# Patient Record
Sex: MALE | Race: WHITE | Employment: OTHER | URBAN - METROPOLITAN AREA
[De-identification: names, ages, dates, MRNs, and addresses within clinical notes are randomized per-mention and may not be internally consistent; named-entity substitution may affect disease eponyms.]

---

## 2017-03-06 ENCOUNTER — HOSPITAL ENCOUNTER (OUTPATIENT)
Dept: ENDOSCOPY | Age: 62
Discharge: HOME | End: 2017-03-06
Attending: INTERNAL MEDICINE | Admitting: INTERNAL MEDICINE

## 2017-03-06 RX ORDER — SODIUM CHLORIDE 0.9 % (FLUSH) 0.9 %
10 SYRINGE (ML) INJECTION EVERY 12 HOURS SCHEDULED
Status: DISCONTINUED | OUTPATIENT
Start: 2017-03-06 | End: 2017-03-06

## 2017-03-06 RX ORDER — LIDOCAINE HYDROCHLORIDE 10 MG/ML
1 INJECTION, SOLUTION EPIDURAL; INFILTRATION; INTRACAUDAL; PERINEURAL
Status: ACTIVE | OUTPATIENT
Start: 2017-03-06 | End: 2017-03-06

## 2017-03-06 RX ORDER — SODIUM CHLORIDE 0.9 % (FLUSH) 0.9 %
10 SYRINGE (ML) INJECTION PRN
Status: DISCONTINUED | OUTPATIENT
Start: 2017-03-06 | End: 2017-03-06

## 2017-03-06 RX ORDER — SODIUM CHLORIDE 9 MG/ML
INJECTION, SOLUTION INTRAVENOUS CONTINUOUS
Status: DISCONTINUED | OUTPATIENT
Start: 2017-03-06 | End: 2017-03-06

## 2017-03-06 RX ORDER — SODIUM CHLORIDE 0.9 % (FLUSH) 0.9 %
10 SYRINGE (ML) INJECTION EVERY 12 HOURS SCHEDULED
Status: DISCONTINUED | OUTPATIENT
Start: 2017-03-06 | End: 2017-03-07 | Stop reason: HOSPADM

## 2017-03-06 RX ORDER — SODIUM CHLORIDE 0.9 % (FLUSH) 0.9 %
10 SYRINGE (ML) INJECTION PRN
Status: DISCONTINUED | OUTPATIENT
Start: 2017-03-06 | End: 2017-03-07 | Stop reason: HOSPADM

## 2017-03-06 RX ORDER — SODIUM CHLORIDE 9 MG/ML
INJECTION, SOLUTION INTRAVENOUS CONTINUOUS
Status: DISCONTINUED | OUTPATIENT
Start: 2017-03-06 | End: 2017-03-07 | Stop reason: HOSPADM

## 2017-03-06 ASSESSMENT — ENCOUNTER SYMPTOMS: SHORTNESS OF BREATH: 0

## 2021-12-02 ENCOUNTER — HOSPITAL ENCOUNTER (OUTPATIENT)
Dept: MRI IMAGING | Age: 66
Discharge: HOME | End: 2021-12-02
Payer: MEDICARE

## 2021-12-02 DIAGNOSIS — M75.122 COMPLETE TEAR OF LEFT ROTATOR CUFF, UNSPECIFIED WHETHER TRAUMATIC: ICD-10-CM

## 2021-12-02 PROCEDURE — 73221 MRI JOINT UPR EXTREM W/O DYE: CPT

## 2021-12-21 ENCOUNTER — HOSPITAL ENCOUNTER (OUTPATIENT)
Dept: PHYSICAL THERAPY | Age: 66
Setting detail: THERAPIES SERIES
Discharge: HOME | End: 2021-12-21
Payer: MEDICARE

## 2021-12-21 PROCEDURE — 97161 PT EVAL LOW COMPLEX 20 MIN: CPT

## 2021-12-21 PROCEDURE — 97016 VASOPNEUMATIC DEVICE THERAPY: CPT

## 2021-12-21 PROCEDURE — 97110 THERAPEUTIC EXERCISES: CPT

## 2021-12-21 NOTE — FLOWSHEET NOTE
[facility]    Physical Therapy Treatment Note/ Progress Report:     Date:  2021    Patient Name:  Garth Finley    :  1955  MRN: [id number]  Medical/Treatment Diagnosis Information:  · Diagnosis: s/p L shoulder rotator cuff repair 21  · Treatment Diagnosis: L shoulder pain Y57.772  Insurance/Certification information:  PT Insurance Information: Medicare  Physician Information:  Referring Practitioner: Lyudmila Sorenson DO  Plan of care signed (Y/N):     Date of Patient follow up with Physician:      Progress Report: []  Yes  [x]  No     Functional Scale:   21 SPADI = 86% disability    Date Range for reporting period:  Beginnin21  Ending:      Progress report due (10 Rx/or 30 days whichever is less):      Recertification due (POC duration/ or 90 days whichever is less): 3/21/22     Visit # Insurance Allowable Auth Needed   1 medicare []Yes    []No     Pain level:  0-5/10     SUBJECTIVE:  See eval    OBJECTIVE: See eval   Observation:    Test measurements:      RESTRICTIONS/PRECAUTIONS: large RCR, sAD, DCE -  DOS 21    Exercises/Interventions:   Therapeutic Ex Wt / Resistance Sets/sec Reps Notes   scap retraction  2 10    Shoulder rolls  2 10    Wand ER  1 15    Table slides  1 15                                                                             Therapeutic Activities                                                                      Manual Intervention       Shoulder PROM  5 min  V. guarded   Post Cap mobs       Thoracic/Rib manipulation       CT MT/Mobs       PROM MT                     NMR re-education                                                                   Therapeutic Exercise and NMR EXR  [x] (76070) Provided verbal/tactile cueing for activities related to strengthening, flexibility, endurance, ROM  for improvements in scapular, scapulothoracic and UE control with self care, reaching, carrying, lifting, house/yardwork, driving/computer work.    [] (55357) Provided verbal/tactile cueing for activities related to improving balance, coordination, kinesthetic sense, posture, motor skill, proprioception  to assist with  scapular, scapulothoracic and UE control with self care, reaching, carrying, lifting, house/yardwork, driving/computer work. Therapeutic Activities:    [x] (81451 or 54030) Provided verbal/tactile cueing for activities related to improving balance, coordination, kinesthetic sense, posture, motor skill, proprioception and motor activation to allow for proper function of scapular, scapulothoracic and UE control with self care, carrying, lifting, driving/computer work.      Home Exercise Program:    [x] (48979) Reviewed/Progressed HEP activities related to strengthening, flexibility, endurance, ROM of scapular, scapulothoracic and UE control with self care, reaching, carrying, lifting, house/yardwork, driving/computer work  [] (99339) Reviewed/Progressed HEP activities related to improving balance, coordination, kinesthetic sense, posture, motor skill, proprioception of scapular, scapulothoracic and UE control with self care, reaching, carrying, lifting, house/yardwork, driving/computer work      Manual Treatments:  PROM / STM / Oscillations-Mobs:  G-I, II, III, IV (PA's, Inf., Post.)  [x] (32176) Provided manual therapy to mobilize soft tissue/joints of cervical/CT, scapular GHJ and UE for the purpose of modulating pain, promoting relaxation,  increasing ROM, reducing/eliminating soft tissue swelling/inflammation/restriction, improving soft tissue extensibility and allowing for proper ROM for normal function with self care, reaching, carrying, lifting, house/yardwork, driving/computer work    Modalities:  Game ready for post op swelling x 15 min    Charges:  Timed Code Treatment Minutes: eval +15   Total Treatment Minutes: 60       [x] EVAL (LOW) 57050 (typically 20 minutes face-to-face)  [] EVAL (MOD) 93844 (typically 30 minutes face-to-face)  [] EVAL (HIGH) 56550 (typically 45 minutes face-to-face)  [] RE-EVAL     [x] ES(65224) x     [] IONTO (04522)  [] NMR (70316) x     [x] VASO (44404)  [] Manual (28213) x     [] Other:  [] TA (71792)x     [] Mech Traction (38095)  [] ES(attended) (36089)     [] ES (un) (97103):       GOALS:  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 Progressing: []? Met: []? Not Met: []? Adjusted     Therapist goals for Patient:   Short Term Goals: To be achieved in: 2 weeks  1. Independent in HEP and progression per patient tolerance, in order to prevent re-injury. []? Progressing: []? Met: []? Not Met: []? Adjusted  2. Patient will have a decrease in pain to facilitate improvement in movement, function, and ADLs as indicated by Functional Deficits. []? Progressing: []? Met: []? Not Met: []? Adjusted     Long Term Goals: To be achieved in: 8-12 weeks  1. Disability index score of 20% or less for the DASH to assist with reaching prior level of function. []? Progressing: []? Met: []? Not Met: []? Adjusted  2. Patient will demonstrate increased AROM to [facility] to allow for proper joint functioning as indicated by patients Functional Deficits. []? Progressing: []? Met: []? Not Met: []? Adjusted  3. Patient will demonstrate an increase in Strength to within 5lbs of contralateral shoulder to allow for proper functional mobility as indicated by patients Functional Deficits. []? Progressing: []? Met: []? Not Met: []? Adjusted  4. Patient will return to bathing/dressing/ADLs without increased symptoms or restriction. []? Progressing: []? Met: []? Not Met: []? Adjusted  5. Pt will tolerate reaching OH without increased symptoms    []? Progressing: []? Met: []? Not Met: []? Adjusted    Progression Towards Functional goals:  [] Patient is progressing as expected towards functional goals listed. [] Progression is slowed due to complexities listed.   [] Progression has been slowed due to co-morbidities. [x] Plan just implemented, too soon to assess goals progression  [] Other:     ASSESSMENT:  See eval      Treatment/Activity Tolerance:  [x] Patient tolerated treatment well [] Patient limited by fatique  [] Patient limited by pain  [] Patient limited by other medical complications  [] Other:     Overall Progression Towards Functional goals/ Treatment Progress Update:  [] Patient is progressing as expected towards functional goals listed. [] Progression is slowed due to complexities/Impairments listed. [] Progression has been slowed due to co-morbidities. [x] Plan just implemented, too soon to assess goals progression <30days   [] Goals require adjustment due to lack of progress  [] Patient is not progressing as expected and requires additional follow up with physician  [] Other    Prognosis for POC: [x] Good [] Fair  [] Poor    Patient requires continued skilled intervention: [x] Yes  [] No      PLAN: See eval  [] Continue per plan of care [] Alter current plan (see comments)  [x] Plan of care initiated [] Hold pending MD visit [] Discharge    Electronically signed by: Samara Laguerre PT     Note: If patient does not return for scheduled/recommended follow up visits, this note will serve as a discharge from care along with the most recent update on progress.

## 2021-12-21 NOTE — PLAN OF CARE
[facility]  [address] [place] [address]  Phone [phone]   Fax [phone]                                                       Physical Therapy Certification    Dear Referring Practitioner: Randa Fleischer, DO,    We had the pleasure of evaluating the following patient for physical therapy services at [address] [place] [address]. A summary of our findings can be found in the initial assessment below. This includes our plan of care. If you have any questions or concerns regarding these findings, please do not hesitate to contact me at the office phone number checked above. Thank you for the referral.       Physician Signature:_______________________________Date:__________________  By signing above (or electronic signature), therapists plan is approved by physician      Patient: Rony Flanagan   : 1955   MRN: [id number]  Referring Physician: Referring Practitioner: Randa Fleischer, DO      Evaluation Date: 2021      Medical Diagnosis Information:  Diagnosis: s/p L shoulder rotator cuff repair 21   Treatment Diagnosis: L shoulder pain M25.512                                         Insurance information: PT Insurance Information: Medicare    Precautions/ Contra-indications: large repair    C-SSRS Triggered by Intake questionnaire (Past 2 wk assessment):   [x] No, Questionnaire did not trigger screening.   [] Yes, Patient intake triggered further evaluation      [] C-SSRS Screening completed  [] PCP notified via Plan of Care  [] Emergency services notified     Latex Allergy:  [x]NO      []YES  Preferred Language for Healthcare:   [x]English       []Other:      SUBJECTIVE: Patient stated complaint: Pt is s/p large left shoulder rotator cuff repair performed on 21. Pt states that he is feeling better today than last week, pain is starting to decrease. Minimal pain when in the sling.   Can get sharp physician regarding ROS issues if not already being addressed at this time. Co-morbidities/Complexities (which will affect course of rehabilitation):   []None           Arthritic conditions   []Rheumatoid arthritis (M05.9)  []Osteoarthritis (M19.91)   Cardiovascular conditions   []Hypertension (I10)  []Hyperlipidemia (E78.5)  []Angina pectoris (I20)  []Atherosclerosis (I70)   Musculoskeletal conditions   []Disc pathology   []Congenital spine pathologies   []Prior surgical intervention  []Osteoporosis (M81.8)  []Osteopenia (M85.8)   Endocrine conditions   []Hypothyroid (E03.9)  []Hyperthyroid Gastrointestinal conditions   []Constipation (Z73.58)   Metabolic conditions   []Morbid obesity (E66.01)  []Diabetes type 1(E10.65) or 2 (E11.65)   []Neuropathy (G60.9)     Pulmonary conditions   []Asthma (J45)  []Coughing   []COPD (J44.9)   Psychological Disorders  []Anxiety (F41.9)  []Depression (F32.9)   []Other:   []Other:          Barriers to/and or personal factors that will affect rehab potential:              [x]Age  []Sex              []Motivation/Lack of Motivation                        []Co-Morbidities              []Cognitive Function, education/learning barriers              []Environmental, home barriers              []profession/work barriers  []past PT/medical experience  []other:  Justification: impaired healing     Falls Risk Assessment (30 days):   [x] Falls Risk assessed and no intervention required.   [] Falls Risk assessed and Patient requires intervention due to being higher risk   TUG score (>12s at risk):     [] Falls education provided, including        ASSESSMENT: s/p left shoulder large rotator cuff repair with SAD, and DCE on 12/14/21  Functional Impairments   [x]Noted spinal or UE joint hypomobility   []Noted spinal or UE joint hypermobility   [x]Decreased UE functional ROM   [x]Decreased UE functional strength   []Abnormal reflexes/sensation/myotomal/dermatomal deficits   [x]Decreased Therapy Evaluation Complexity Justification  [x] A history of present problem with:  [x] no personal factors and/or comorbidities that impact the plan of care;  []1-2 personal factors and/or comorbidities that impact the plan of care  []3 personal factors and/or comorbidities that impact the plan of care  [x] An examination of body systems using standardized tests and measures addressing any of the following: body structures and functions (impairments), activity limitations, and/or participation restrictions;:  [x] a total of 1-2 or more elements   [] a total of 3 or more elements   [] a total of 4 or more elements   [x] A clinical presentation with:  [x] stable and/or uncomplicated characteristics   [] evolving clinical presentation with changing characteristics  [] unstable and unpredictable characteristics;   [x] Clinical decision making of [x] low, [] moderate, [] high complexity using standardized patient assessment instrument and/or measurable assessment of functional outcome. [x] EVAL (LOW) 92183 (typically 30 minutes face-to-face)  [] EVAL (MOD) 22094 (typically 30 minutes face-to-face)  [] EVAL (HIGH) 75020 (typically 45 minutes face-to-face)  [] RE-EVAL     PLAN:  Frequency/Duration:  1-2 days per week for 8-12 Weeks:  INTERVENTIONS:  [x] Therapeutic exercise including: strength training, ROM, for Upper extremity and core   [x]  NMR activation and proprioception for UE, scap and Core   [x] Manual therapy as indicated for shoulder, scapula and spine to include: Dry Needling/IASTM, STM, PROM, Gr I-IV mobilizations, manipulation. [x] Modalities as needed that may include: thermal agents, E-stim, Biofeedback, US, iontophoresis as indicated  [x] Patient education on joint protection, postural re-education, activity modification, progression of HEP. HEP instruction: Patient instructed in, and demonstrated proper form of, exercises.  Copy of exercises scanned into media file  (see scanned

## 2021-12-29 ENCOUNTER — HOSPITAL ENCOUNTER (OUTPATIENT)
Dept: PHYSICAL THERAPY | Age: 66
Setting detail: THERAPIES SERIES
End: 2021-12-29
Payer: MEDICARE

## 2021-12-30 ENCOUNTER — HOSPITAL ENCOUNTER (OUTPATIENT)
Dept: PHYSICAL THERAPY | Age: 66
Setting detail: THERAPIES SERIES
Discharge: HOME | End: 2021-12-30
Payer: MEDICARE

## 2021-12-30 PROCEDURE — 97110 THERAPEUTIC EXERCISES: CPT

## 2021-12-30 PROCEDURE — 97016 VASOPNEUMATIC DEVICE THERAPY: CPT

## 2021-12-30 PROCEDURE — 97140 MANUAL THERAPY 1/> REGIONS: CPT

## 2021-12-30 NOTE — FLOWSHEET NOTE
[facility] [employer] [facility]    Physical Therapy Treatment Note/ Progress Report:     Date:  2021    Patient Name:  Petr Goldman    :  1955  MRN: [id number]  Medical/Treatment Diagnosis Information:  · Diagnosis: s/p L shoulder rotator cuff repair 21  · Treatment Diagnosis: L shoulder pain V25.292  Insurance/Certification information:  PT Insurance Information: Medicare  Physician Information:  Referring Practitioner: Kathe Appiah DO  Plan of care signed (Y/N): Pending    Date of Patient follow up with Physician:      Progress Report: []  Yes  [x]  No     Functional Scale:   21 SPADI = 86% disability    Date Range for reporting period:  Beginnin21  Ending:      Progress report due (10 Rx/or 30 days whichever is less):      Recertification due (POC duration/ or 90 days whichever is less): 3/21/22     Visit # Insurance Allowable Auth Needed   2 medicare []Yes    []No     Pain level:  0-5/10     SUBJECTIVE:  Pt notes he experiences pain at night when trying to sleep. He is current spending time between a sofa and recliner. Pt states he has been compliant with his HEP. OBJECTIVE:     Observation:    Test measurements:      PROM (L) Shoulder:   75 deg with table slide    RESTRICTIONS/PRECAUTIONS: large RCR, sAD, DCE -  DOS 21    Exercises/Interventions:   Therapeutic Ex x 15 Wt / Resistance Sets/sec Reps Notes   scap retraction  2 10    Shoulder rolls  2 10    Wand ER  1 15    Table slides  1 15    Pendulums Fwd back, CW, CCW  x20 a Reviewed    scap clocks npv                                                                 Therapeutic Activities                                                                      Manual Intervention x 15       Shoulder PROM Gentle oscillations, flex, ER, abd within appropriate post op restrictions 15 min  Significant guarding noted initially.   Improved with oscillations and cueing   Post Cap mobs       Thoracic/Rib manipulation       CT MT/Mobs       PROM MT                     NMR re-education                                                                   Therapeutic Exercise and NMR EXR  [x] (52627) Provided verbal/tactile cueing for activities related to strengthening, flexibility, endurance, ROM  for improvements in scapular, scapulothoracic and UE control with self care, reaching, carrying, lifting, house/yardwork, driving/computer work.    [] (27112) Provided verbal/tactile cueing for activities related to improving balance, coordination, kinesthetic sense, posture, motor skill, proprioception  to assist with  scapular, scapulothoracic and UE control with self care, reaching, carrying, lifting, house/yardwork, driving/computer work. Therapeutic Activities:    [x] (12091 or 48553) Provided verbal/tactile cueing for activities related to improving balance, coordination, kinesthetic sense, posture, motor skill, proprioception and motor activation to allow for proper function of scapular, scapulothoracic and UE control with self care, carrying, lifting, driving/computer work.      Home Exercise Program:    [x] (55592) Reviewed/Progressed HEP activities related to strengthening, flexibility, endurance, ROM of scapular, scapulothoracic and UE control with self care, reaching, carrying, lifting, house/yardwork, driving/computer work  [] (97001) Reviewed/Progressed HEP activities related to improving balance, coordination, kinesthetic sense, posture, motor skill, proprioception of scapular, scapulothoracic and UE control with self care, reaching, carrying, lifting, house/yardwork, driving/computer work      Manual Treatments:  PROM / STM / Oscillations-Mobs:  G-I, II, III, IV (PA's, Inf., Post.)  [x] (87218) Provided manual therapy to mobilize soft tissue/joints of cervical/CT, scapular GHJ and UE for the purpose of modulating pain, promoting relaxation,  increasing ROM, reducing/eliminating soft tissue swelling/inflammation/restriction, improving soft tissue extensibility and allowing for proper ROM for normal function with self care, reaching, carrying, lifting, house/yardwork, driving/computer work    Modalities:  Game ready for post op swelling x 10 min    Charges:  Timed Code Treatment Minutes: 30'   Total Treatment Minutes: 40'       [] EVAL (LOW) 455 1011 (typically 20 minutes face-to-face)  [] EVAL (MOD) 66414 (typically 30 minutes face-to-face)  [] EVAL (HIGH) 69363 (typically 45 minutes face-to-face)  [] RE-EVAL     [x] IT(21900) x   1  [] IONTO (77533)  [] NMR (55766) x     [x] VASO (96604)  [x] Manual (09477) x1     [] Other:  [] TA (66052)x     [] Mech Traction (45710)  [] ES(attended) (99338)     [] ES (un) (70832):       GOALS:  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 Progressing: []? Met: []? Not Met: []? Adjusted     Therapist goals for Patient:   Short Term Goals: To be achieved in: 2 weeks  1. Independent in HEP and progression per patient tolerance, in order to prevent re-injury. []? Progressing: []? Met: []? Not Met: []? Adjusted  2. Patient will have a decrease in pain to facilitate improvement in movement, function, and ADLs as indicated by Functional Deficits. []? Progressing: []? Met: []? Not Met: []? Adjusted     Long Term Goals: To be achieved in: 8-12 weeks  1. Disability index score of 20% or less for the DASH to assist with reaching prior level of function. []? Progressing: []? Met: []? Not Met: []? Adjusted  2. Patient will demonstrate increased AROM to [facility] to allow for proper joint functioning as indicated by patients Functional Deficits. []? Progressing: []? Met: []? Not Met: []? Adjusted  3. Patient will demonstrate an increase in Strength to within 5lbs of contralateral shoulder to allow for proper functional mobility as indicated by patients Functional Deficits. []? Progressing: []? Met: []? Not Met: []? Adjusted  4.  Patient will return to bathing/dressing/ADLs without increased symptoms or restriction. []? Progressing: []? Met: []? Not Met: []? Adjusted  5. Pt will tolerate reaching OH without increased symptoms    []? Progressing: []? Met: []? Not Met: []? Adjusted    Progression Towards Functional goals:  [] Patient is progressing as expected towards functional goals listed. [] Progression is slowed due to complexities listed. [] Progression has been slowed due to co-morbidities. [x] Plan just implemented, too soon to assess goals progression  [] Other:     ASSESSMENT:  Significant guarding remains with both self-led and manually guide PROM of the (L) shoulder. This nearly resolved with cueing and manual oscillations to help relax tension. Pt does continue to note significant pain with early movement of the shoulder, especially with ER, and struggled to get to neutral rotation this date. Reviewed HEP and discussed expected progression per protocol. Treatment/Activity Tolerance:  [x] Patient tolerated treatment well [] Patient limited by fatique  [] Patient limited by pain  [] Patient limited by other medical complications  [] Other:     Overall Progression Towards Functional goals/ Treatment Progress Update:  [] Patient is progressing as expected towards functional goals listed. [] Progression is slowed due to complexities/Impairments listed. [] Progression has been slowed due to co-morbidities.   [x] Plan just implemented, too soon to assess goals progression <30days   [] Goals require adjustment due to lack of progress  [] Patient is not progressing as expected and requires additional follow up with physician  [] Other    Prognosis for POC: [x] Good [] Fair  [] Poor    Patient requires continued skilled intervention: [x] Yes  [] No      PLAN: See eval  [] Continue per plan of care [] Alter current plan (see comments)  [x] Plan of care initiated [] Hold pending MD visit [] Discharge    Electronically signed by: Xander Weir PT     Note: If patient does not return for scheduled/recommended follow up visits, this note will serve as a discharge from care along with the most recent update on progress.

## 2022-01-04 ENCOUNTER — HOSPITAL ENCOUNTER (OUTPATIENT)
Dept: PHYSICAL THERAPY | Age: 67
Setting detail: THERAPIES SERIES
Discharge: HOME | End: 2022-01-04
Payer: MEDICARE

## 2022-01-04 PROCEDURE — G0283 ELEC STIM OTHER THAN WOUND: HCPCS

## 2022-01-04 PROCEDURE — 97110 THERAPEUTIC EXERCISES: CPT

## 2022-01-04 PROCEDURE — 97140 MANUAL THERAPY 1/> REGIONS: CPT

## 2022-01-04 NOTE — FLOWSHEET NOTE
[facility] [employer]    Physical Therapy Treatment Note/ Progress Report:     Date:  2022    Patient Name:  May Shaver    :  1955  MRN: [id number]  Medical/Treatment Diagnosis Information:  · Diagnosis: s/p L shoulder rotator cuff repair 21  · Treatment Diagnosis: L shoulder pain N03.391  Insurance/Certification information:  PT Insurance Information: Medicare  Physician Information:  Referring Practitioner: Esteban Aquino DO  Plan of care signed (Y/N): Pending    Date of Patient follow up with Physician:      Progress Report: []  Yes  [x]  No     Functional Scale:   21 SPADI = 86% disability    Date Range for reporting period:  Beginnin21  Ending:      Progress report due (10 Rx/or 30 days whichever is less): 85     Recertification due (POC duration/ or 90 days whichever is less): 3/21/22     Visit # Insurance Allowable Auth Needed   3 medicare []Yes    []No     Pain level:  0-5/10     SUBJECTIVE:  Pt states that he is taking advil PRN, only 400mg. Did not take anything prior to PT today. OBJECTIVE:     Observation:    Test measurements:      PROM (L) Shoulder:   75 deg with table slide    RESTRICTIONS/PRECAUTIONS: large RCR, sAD, DCE -  DOS 21    Exercises/Interventions:   Therapeutic Ex x 15 Wt / Resistance Sets/sec Reps Notes   scap retraction  2 10    Shoulder rolls  2 10    Wand ER  1 15    Table slides  1 15    Pendulums Fwd back, CW, CCW  x20 a Reviewed    scap clocks npv                                                                 Therapeutic Activities                                                                      Manual Intervention x 25       Shoulder PROM Gentle oscillations, flex, ER, abd within appropriate post op restrictions 25 min  Significant guarding noted initially.   Improved with oscillations and cueing   Post Cap mobs       Thoracic/Rib manipulation       CT MT/Mobs PROM MT                     NMR re-education                                                                   Therapeutic Exercise and NMR EXR  [x] (96932) Provided verbal/tactile cueing for activities related to strengthening, flexibility, endurance, ROM  for improvements in scapular, scapulothoracic and UE control with self care, reaching, carrying, lifting, house/yardwork, driving/computer work.    [] (74453) Provided verbal/tactile cueing for activities related to improving balance, coordination, kinesthetic sense, posture, motor skill, proprioception  to assist with  scapular, scapulothoracic and UE control with self care, reaching, carrying, lifting, house/yardwork, driving/computer work. Therapeutic Activities:    [x] (30204 or 40865) Provided verbal/tactile cueing for activities related to improving balance, coordination, kinesthetic sense, posture, motor skill, proprioception and motor activation to allow for proper function of scapular, scapulothoracic and UE control with self care, carrying, lifting, driving/computer work.      Home Exercise Program:    [x] (70344) Reviewed/Progressed HEP activities related to strengthening, flexibility, endurance, ROM of scapular, scapulothoracic and UE control with self care, reaching, carrying, lifting, house/yardwork, driving/computer work  [] (17379) Reviewed/Progressed HEP activities related to improving balance, coordination, kinesthetic sense, posture, motor skill, proprioception of scapular, scapulothoracic and UE control with self care, reaching, carrying, lifting, house/yardwork, driving/computer work      Manual Treatments:  PROM / STM / Oscillations-Mobs:  G-I, II, III, IV (PA's, Inf., Post.)  [x] (97742) Provided manual therapy to mobilize soft tissue/joints of cervical/CT, scapular GHJ and UE for the purpose of modulating pain, promoting relaxation,  increasing ROM, reducing/eliminating soft tissue swelling/inflammation/restriction, improving soft tissue extensibility and allowing for proper ROM for normal function with self care, reaching, carrying, lifting, house/yardwork, driving/computer work    Modalities:    premod applied during manual therapy     Charges:  Timed Code Treatment Minutes: 45   Total Treatment Minutes: 45       [] EVAL (LOW) 63832 (typically 20 minutes face-to-face)  [] EVAL (MOD) 43391 (typically 30 minutes face-to-face)  [] EVAL (HIGH) 01636 (typically 45 minutes face-to-face)  [] RE-EVAL     [x] KZ(96635) x   1  [] IONTO (31326)  [] NMR (68297) x     [] VASO (22644)  [x] Manual (05586) x2     [] Other:  [] TA (26139)x     [] Mech Traction (61060)  [] ES(attended) (81805)     [x] ES (un) (02992):       GOALS:  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 Progressing: []? Met: []? Not Met: []? Adjusted     Therapist goals for Patient:   Short Term Goals: To be achieved in: 2 weeks  1. Independent in HEP and progression per patient tolerance, in order to prevent re-injury. []? Progressing: []? Met: []? Not Met: []? Adjusted  2. Patient will have a decrease in pain to facilitate improvement in movement, function, and ADLs as indicated by Functional Deficits. []? Progressing: []? Met: []? Not Met: []? Adjusted     Long Term Goals: To be achieved in: 8-12 weeks  1. Disability index score of 20% or less for the DASH to assist with reaching prior level of function. []? Progressing: []? Met: []? Not Met: []? Adjusted  2. Patient will demonstrate increased AROM to [facility] to allow for proper joint functioning as indicated by patients Functional Deficits. []? Progressing: []? Met: []? Not Met: []? Adjusted  3. Patient will demonstrate an increase in Strength to within 5lbs of contralateral shoulder to allow for proper functional mobility as indicated by patients Functional Deficits. []? Progressing: []? Met: []? Not Met: []? Adjusted  4. Patient will return to bathing/dressing/ADLs without increased symptoms or restriction.    []? Progressing: []? Met: []? Not Met: []? Adjusted  5. Pt will tolerate reaching OH without increased symptoms    []? Progressing: []? Met: []? Not Met: []? Adjusted    Progression Towards Functional goals:  [] Patient is progressing as expected towards functional goals listed. [] Progression is slowed due to complexities listed. [] Progression has been slowed due to co-morbidities. [x] Plan just implemented, too soon to assess goals progression  [] Other:     ASSESSMENT:  Pt very stiff and guarded today throughout manual therapy. ER to neutral only today. Advised to take pain meds one hour prior to therapy and pt's frequency will be increased to 2x/week in order to increase PROM. Also advised to take sling off when he is sitting and resting. Treatment/Activity Tolerance:  [x] Patient tolerated treatment well [] Patient limited by fatique  [] Patient limited by pain  [] Patient limited by other medical complications  [] Other:     Overall Progression Towards Functional goals/ Treatment Progress Update:  [] Patient is progressing as expected towards functional goals listed. [] Progression is slowed due to complexities/Impairments listed. [] Progression has been slowed due to co-morbidities. [x] Plan just implemented, too soon to assess goals progression <30days   [] Goals require adjustment due to lack of progress  [] Patient is not progressing as expected and requires additional follow up with physician  [] Other    Prognosis for POC: [x] Good [] Fair  [] Poor    Patient requires continued skilled intervention: [x] Yes  [] No      PLAN:   [x] Continue per plan of care [] Alter current plan (see comments)  [] Plan of care initiated [] Hold pending MD visit [] Discharge    Electronically signed by: Ewelina Frank PT     Note: If patient does not return for scheduled/recommended follow up visits, this note will serve as a discharge from care along with the most recent update on progress.

## 2022-01-04 NOTE — FLOWSHEET NOTE
[address] [employer]    Physical Therapy Treatment Note/ Progress Report:     Date:  2022    Patient Name:  Selin Segura    :  1955  MRN: [id number]  Medical/Treatment Diagnosis Information:  · Diagnosis: s/p L shoulder rotator cuff repair 21  · Treatment Diagnosis: L shoulder pain H44.552  Insurance/Certification information:  PT Insurance Information: Medicare  Physician Information:  Referring Practitioner: Quinn Khan DO  Plan of care signed (Y/N): Pending    Date of Patient follow up with Physician:  Delores Lemons       Progress Report: []  Yes  [x]  No     Functional Scale:   21 SPADI = 86% disability    Date Range for reporting period:  Beginnin21  Ending:      Progress report due (10 Rx/or 30 days whichever is less): 86     Recertification due (POC duration/ or 90 days whichever is less): 3/21/22     Visit # Insurance Allowable Auth Needed   3 medicare []Yes    []No     Pain level:  0-/10     SUBJECTIVE:  Pt states that he feels \"yucky\" today and is sleeping on his couch using the back of the couch to help support him. +HEP as directed. Taking some OTC meds like Advil PRN and is using ice after his HEP session.     OBJECTIVE:     Observation:    Test measurements:      PROM (L) Shoulder:   75 deg with table slide    RESTRICTIONS/PRECAUTIONS: large RCR, SAD, DCE -  DOS 21    Exercises/Interventions:   Therapeutic Ex x 15' Wt / Resistance Sets/sec Reps Notes   scap retraction  2 10    Shoulder rolls  2 10    Wand ER  1 15    Table slides  1 15    Pendulums Fwd back, CW, CCW  x20 a Reviewed    scap clocks npv                                                                 Therapeutic Activities                                                                      Manual Intervention x 15       Shoulder PROM Gentle oscillations, flex, ER, abd within appropriate post op restrictions 15 min  Significant guarding noted initially. Improved with oscillations and cueing   Post Cap mobs       Thoracic/Rib manipulation       CT MT/Mobs       PROM MT                     NMR re-education                                                                   Therapeutic Exercise and NMR EXR  [x] (50860) Provided verbal/tactile cueing for activities related to strengthening, flexibility, endurance, ROM  for improvements in scapular, scapulothoracic and UE control with self care, reaching, carrying, lifting, house/yardwork, driving/computer work.    [] (20026) Provided verbal/tactile cueing for activities related to improving balance, coordination, kinesthetic sense, posture, motor skill, proprioception  to assist with  scapular, scapulothoracic and UE control with self care, reaching, carrying, lifting, house/yardwork, driving/computer work. Therapeutic Activities:    [x] (38494 or 28698) Provided verbal/tactile cueing for activities related to improving balance, coordination, kinesthetic sense, posture, motor skill, proprioception and motor activation to allow for proper function of scapular, scapulothoracic and UE control with self care, carrying, lifting, driving/computer work.      Home Exercise Program:    [x] (83298) Reviewed/Progressed HEP activities related to strengthening, flexibility, endurance, ROM of scapular, scapulothoracic and UE control with self care, reaching, carrying, lifting, house/yardwork, driving/computer work  [] (08908) Reviewed/Progressed HEP activities related to improving balance, coordination, kinesthetic sense, posture, motor skill, proprioception of scapular, scapulothoracic and UE control with self care, reaching, carrying, lifting, house/yardwork, driving/computer work      Manual Treatments:  PROM / STM / Oscillations-Mobs:  G-I, II, III, IV (PA's, Inf., Post.)  [x] (41731) Provided manual therapy to mobilize soft tissue/joints of cervical/CT, scapular GHJ and UE for the purpose of modulating pain, promoting relaxation,  increasing ROM, reducing/eliminating soft tissue swelling/inflammation/restriction, improving soft tissue extensibility and allowing for proper ROM for normal function with self care, reaching, carrying, lifting, house/yardwork, driving/computer work    Modalities:  Game ready for post op swelling x 10 min    Charges:  Timed Code Treatment Minutes: 30'   Total Treatment Minutes: 40'       [] EVAL (LOW) 00047 (typically 20 minutes face-to-face)  [] EVAL (MOD) 64240 (typically 30 minutes face-to-face)  [] EVAL (HIGH) 48827 (typically 45 minutes face-to-face)  [] RE-EVAL     [x] KW(43320) x   1  [] IONTO (97149)  [] NMR (43651) x     [x] VASO (07563)  [x] Manual (27827) x1     [] Other:  [] TA (59933)x     [] Mech Traction (02608)  [] ES(attended) (64172)     [] ES (un) (93761):       GOALS:  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 Progressing: []? Met: []? Not Met: []? Adjusted     Therapist goals for Patient:   Short Term Goals: To be achieved in: 2 weeks  1. Independent in HEP and progression per patient tolerance, in order to prevent re-injury. []? Progressing: []? Met: []? Not Met: []? Adjusted  2. Patient will have a decrease in pain to facilitate improvement in movement, function, and ADLs as indicated by Functional Deficits. []? Progressing: []? Met: []? Not Met: []? Adjusted     Long Term Goals: To be achieved in: 8-12 weeks  1. Disability index score of 20% or less for the DASH to assist with reaching prior level of function. []? Progressing: []? Met: []? Not Met: []? Adjusted  2. Patient will demonstrate increased AROM to [facility] to allow for proper joint functioning as indicated by patients Functional Deficits. []? Progressing: []? Met: []? Not Met: []? Adjusted  3. Patient will demonstrate an increase in Strength to within 5lbs of contralateral shoulder to allow for proper functional mobility as indicated by patients Functional Deficits. []?  Progressing: Discharge    Electronically signed by: Chandra Donohue, PTA, [id number]     Note: If patient does not return for scheduled/recommended follow up visits, this note will serve as a discharge from care along with the most recent update on progress.

## 2022-01-06 ENCOUNTER — HOSPITAL ENCOUNTER (OUTPATIENT)
Dept: PHYSICAL THERAPY | Age: 67
Setting detail: THERAPIES SERIES
Discharge: HOME | End: 2022-01-06
Payer: MEDICARE

## 2022-01-06 PROCEDURE — 97016 VASOPNEUMATIC DEVICE THERAPY: CPT | Performed by: SPECIALIST/TECHNOLOGIST

## 2022-01-06 PROCEDURE — 97110 THERAPEUTIC EXERCISES: CPT | Performed by: SPECIALIST/TECHNOLOGIST

## 2022-01-06 PROCEDURE — G0283 ELEC STIM OTHER THAN WOUND: HCPCS | Performed by: SPECIALIST/TECHNOLOGIST

## 2022-01-06 PROCEDURE — 97140 MANUAL THERAPY 1/> REGIONS: CPT | Performed by: SPECIALIST/TECHNOLOGIST

## 2022-01-06 NOTE — FLOWSHEET NOTE
[address], [employer]    Physical Therapy Treatment Note/ Progress Report:     Date:  2022    Patient Name:  Jesse Matthews    :  1955  MRN: [id number]  Medical/Treatment Diagnosis Information:  · Diagnosis: s/p L shoulder rotator cuff repair 21  · Treatment Diagnosis: L shoulder pain G63.696  Insurance/Certification information:  PT Insurance Information: Medicare  Physician Information:  Referring Practitioner: Maeve Ignacio DO  Plan of care signed (Y/N): Pending    Date of Patient follow up with Physician:      Progress Report: []  Yes  [x]  No     Functional Scale:   21 SPADI = 86% disability    Date Range for reporting period:  Beginnin21  Ending:      Progress report due (10 Rx/or 30 days whichever is less): 04     Recertification due (POC duration/ or 90 days whichever is less): 3/21/22     Visit # Insurance Allowable Auth Needed   2  2022  Medicare  []Yes    []No     Pain level:  0-5/10     SUBJECTIVE:  Pt is taking some Advil which is helping his symptoms. Pt reports having to maintain his HEP as able and ices after. OBJECTIVE:       Observation:    Test measurements:      PROM (L) Shoulder:   75 deg with table slide  22 Left shoulder with palpable ST nodule with hematoma present over lateral arm deltoid into central arm. After STM this was able to move with decreased guarding with PROM.  PROM flexion ~90 today w/ TENS unit and after STM    RESTRICTIONS/PRECAUTIONS: large RCR, sAD, DCE -  DOS 21    Exercises/Interventions: 25'  Therapeutic Ex x 15' Wt / Resistance Sets/sec Reps Notes   scap retraction  2 10    Shoulder rolls  2 10    Wand ER 5\"  10 2X 10 @ hep   Table slides  Flexion   1 15x    Pendulums Fwd back, CW, CCW  x20 a Reviewed    scap clocks npv                                                                 Therapeutic Activities 10'       Pt educated about STM with spouse to lateral arm and education with  TENS unit purchase. Pt is guarded out of sling with no arm swing present 10'   1/6                                                           Manual Intervention x 25'       Shoulder PROM Gentle oscillations, flex, ER, abd within appropriate post op restrictions 25 min  Significant guarding noted initially. Improved with oscillations and cueing  TENS unit applied during PROM and supported with pillows   Post Cap mobs       Thoracic/Rib manipulation       CT MT/Mobs       PROM MT                     NMR re-education                                                                   Therapeutic Exercise and NMR EXR  [x] (56398) Provided verbal/tactile cueing for activities related to strengthening, flexibility, endurance, ROM  for improvements in scapular, scapulothoracic and UE control with self care, reaching, carrying, lifting, house/yardwork, driving/computer work.    [] (61153) Provided verbal/tactile cueing for activities related to improving balance, coordination, kinesthetic sense, posture, motor skill, proprioception  to assist with  scapular, scapulothoracic and UE control with self care, reaching, carrying, lifting, house/yardwork, driving/computer work. Therapeutic Activities:    [x] (16655 or 96059) Provided verbal/tactile cueing for activities related to improving balance, coordination, kinesthetic sense, posture, motor skill, proprioception and motor activation to allow for proper function of scapular, scapulothoracic and UE control with self care, carrying, lifting, driving/computer work.      Home Exercise Program:    [x] (65021) Reviewed/Progressed HEP activities related to strengthening, flexibility, endurance, ROM of scapular, scapulothoracic and UE control with self care, reaching, carrying, lifting, house/yardwork, driving/computer work  [] (94691) Reviewed/Progressed HEP activities related to improving balance, coordination, kinesthetic sense, posture, motor skill, proprioception of scapular, scapulothoracic and UE control with self care, reaching, carrying, lifting, house/yardwork, driving/computer work      Manual Treatments:  PROM / STM / Oscillations-Mobs:  G-I, II, III, IV (PA's, Inf., Post.)  [x] (51528) Provided manual therapy to mobilize soft tissue/joints of cervical/CT, scapular GHJ and UE for the purpose of modulating pain, promoting relaxation,  increasing ROM, reducing/eliminating soft tissue swelling/inflammation/restriction, improving soft tissue extensibility and allowing for proper ROM for normal function with self care, reaching, carrying, lifting, house/yardwork, driving/computer work    Modalities:  Game ready for post op swelling x 10 min  premod applied during manual therapy     Charges:  Timed Code Treatment Minutes: 45   Total Treatment Minutes: 55       [] EVAL (LOW) 83551 (typically 20 minutes face-to-face)  [] EVAL (MOD) 72944 (typically 30 minutes face-to-face)  [] EVAL (HIGH) 43087 (typically 45 minutes face-to-face)  [] RE-EVAL     [x] FP(02589) x   1  [] IONTO (42802)  [] NMR (20256) x     [x] VASO (79396)  [x] Manual (65212) x     [] Other:  [] TA (68273)x     [] Mech Traction (43452)  [] ES(attended) (41853)     [x] ES (un) (40476):       GOALS:  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 Progressing: []? Met: []? Not Met: []? Adjusted     Therapist goals for Patient:   Short Term Goals: To be achieved in: 2 weeks  1. Independent in HEP and progression per patient tolerance, in order to prevent re-injury. []? Progressing: []? Met: []? Not Met: []? Adjusted  2. Patient will have a decrease in pain to facilitate improvement in movement, function, and ADLs as indicated by Functional Deficits. []? Progressing: []? Met: []? Not Met: []? Adjusted     Long Term Goals: To be achieved in: 8-12 weeks  1. Disability index score of 20% or less for the DASH to assist with reaching prior level of function. []? Progressing: []? Met: []?  Not Met: []? Adjusted  2. Patient will demonstrate increased AROM to [facility] to allow for proper joint functioning as indicated by patients Functional Deficits. []? Progressing: []? Met: []? Not Met: []? Adjusted  3. Patient will demonstrate an increase in Strength to within 5lbs of contralateral shoulder to allow for proper functional mobility as indicated by patients Functional Deficits. []? Progressing: []? Met: []? Not Met: []? Adjusted  4. Patient will return to bathing/dressing/ADLs without increased symptoms or restriction. []? Progressing: []? Met: []? Not Met: []? Adjusted  5. Pt will tolerate reaching OH without increased symptoms    []? Progressing: []? Met: []? Not Met: []? Adjusted    Progression Towards Functional goals:  [] Patient is progressing as expected towards functional goals listed. [] Progression is slowed due to complexities listed. [] Progression has been slowed due to co-morbidities. [x] Plan just implemented, too soon to assess goals progression  [] Other:     ASSESSMENT:  Pt very stiff and guarded today throughout manual therapy but improved after STM to lateral arm/ deltoid due to hematoma and with application of TENS unit throughout. ER to neutral only today. Advised to take pain meds one hour prior to therapy and pt's frequency will be increased to 2x/week in order to increase PROM. Pt again  advised to take sling off when he is sitting and resting to lengthen biceps and allow for increased mobility with STM to lateral arm daily from spouse. Treatment/Activity Tolerance:  [x] Patient tolerated treatment well [] Patient limited by fatique  [x] Patient limited by pain/ guarding with manual therapy   Patient limited by other medical complications  [] Other:     Overall Progression Towards Functional goals/ Treatment Progress Update:  [] Patient is progressing as expected towards functional goals listed.     [] Progression is slowed due to complexities/Impairments listed. [] Progression has been slowed due to co-morbidities. [x] Plan just implemented, too soon to assess goals progression <30days   [] Goals require adjustment due to lack of progress  [] Patient is not progressing as expected and requires additional follow up with physician  [] Other    Prognosis for POC: [x] Good [] Fair  [] Poor    Patient requires continued skilled intervention: [x] Yes  [] No      PLAN: Progress ROM with coming out of sling while seated, plans to purchase a TENS unit for pain. [x] Continue per plan of care [] Alter current plan (see comments)  [] Plan of care initiated [] Hold pending MD visit [] Discharge    Electronically signed by: Derrick Ryder, PTA, [id number]     Note: If patient does not return for scheduled/recommended follow up visits, this note will serve as a discharge from care along with the most recent update on progress.

## 2022-01-11 ENCOUNTER — HOSPITAL ENCOUNTER (OUTPATIENT)
Dept: PHYSICAL THERAPY | Age: 67
Setting detail: THERAPIES SERIES
Discharge: HOME | End: 2022-01-11
Payer: MEDICARE

## 2022-01-11 PROCEDURE — 97140 MANUAL THERAPY 1/> REGIONS: CPT

## 2022-01-11 PROCEDURE — 97110 THERAPEUTIC EXERCISES: CPT

## 2022-01-11 PROCEDURE — 97016 VASOPNEUMATIC DEVICE THERAPY: CPT

## 2022-01-11 NOTE — FLOWSHEET NOTE
[facility]    Physical Therapy Treatment Note/ Progress Report:     Date:  2022    Patient Name:  Belkis Srivastava    :  1955  MRN: [id number]  Medical/Treatment Diagnosis Information:  · Diagnosis: s/p L shoulder rotator cuff repair 21  · Treatment Diagnosis: L shoulder pain V18.258  Insurance/Certification information:  PT Insurance Information: Medicare  Physician Information:  Referring Practitioner: Mango Dominguez DO  Plan of care signed (Y/N): Pending    Date of Patient follow up with Physician: Kimmy Willingham 22     Progress Report: []  Yes  [x]  No     Functional Scale:   21 SPADI = 86% disability    Date Range for reporting period:  Beginnin21  Ending:      Progress report due (10 Rx/or 30 days whichever is less):      Recertification due (POC duration/ or 90 days whichever is less): 3/21/22     Visit # Insurance Allowable Auth Needed   5 Medicare []Yes    []No     Pain level:  4/10     SUBJECTIVE:  Has been using his TENS machine and his wife has been massaging the hematoma and it seems to be a lot better. Took 400mg advil prior to appointment. OBJECTIVE:  4 weeks post op     Observation:    Test measurements:      PROM (L) Shoulder:  75 deg with table slide  22 Left shoulder with palpable ST nodule with hematoma present over lateral arm deltoid into central arm. After STM this was able to move with decreased guarding with PROM.  PROM flexion ~90 today w/ TENS unit and after STM    RESTRICTIONS/PRECAUTIONS: large RCR, sAD, DCE -  DOS 21    Exercises/Interventions:   Therapeutic Ex   Wt / Resistance Sets/sec Reps Notes   pulleys  5 min     Wand press attempted      scap retraction  2 10    Shoulder rolls  2 10    Wand ER 5\"  10 2X 10 @ hep   Table slides  Flexion   1 15x    Pendulums Fwd back, CW, CCW  x20 a Reviewed    scap clocks  1 20 12-6  3-9          Bent over codman row  2 10 submax iso; flex and IR  5\" 10x                                          Therapeutic Activities                                                                       Manual Intervention x 25'       Shoulder PROM Gentle oscillations, flex, ER, abd within appropriate post op restrictions 25 min  Significant guarding noted initially. Improved with oscillations and cueing  TENS unit applied during PROM and supported with pillows   Post Cap mobs       Thoracic/Rib manipulation       CT MT/Mobs       PROM MT                     NMR re-education                                                                   Therapeutic Exercise and NMR EXR  [x] (51049) Provided verbal/tactile cueing for activities related to strengthening, flexibility, endurance, ROM  for improvements in scapular, scapulothoracic and UE control with self care, reaching, carrying, lifting, house/yardwork, driving/computer work.    [] (01125) Provided verbal/tactile cueing for activities related to improving balance, coordination, kinesthetic sense, posture, motor skill, proprioception  to assist with  scapular, scapulothoracic and UE control with self care, reaching, carrying, lifting, house/yardwork, driving/computer work. Therapeutic Activities:    [x] (68282 or 43161) Provided verbal/tactile cueing for activities related to improving balance, coordination, kinesthetic sense, posture, motor skill, proprioception and motor activation to allow for proper function of scapular, scapulothoracic and UE control with self care, carrying, lifting, driving/computer work.      Home Exercise Program:    [x] (75674) Reviewed/Progressed HEP activities related to strengthening, flexibility, endurance, ROM of scapular, scapulothoracic and UE control with self care, reaching, carrying, lifting, house/yardwork, driving/computer work  [] (27729) Reviewed/Progressed HEP activities related to improving balance, coordination, kinesthetic sense, posture, motor skill, proprioception of scapular, scapulothoracic and UE control with self care, reaching, carrying, lifting, house/yardwork, driving/computer work      Manual Treatments:  PROM / STM / Oscillations-Mobs:  G-I, II, III, IV (PA's, Inf., Post.)  [x] (69609) Provided manual therapy to mobilize soft tissue/joints of cervical/CT, scapular GHJ and UE for the purpose of modulating pain, promoting relaxation,  increasing ROM, reducing/eliminating soft tissue swelling/inflammation/restriction, improving soft tissue extensibility and allowing for proper ROM for normal function with self care, reaching, carrying, lifting, house/yardwork, driving/computer work    Modalities:  Game ready for post op swelling x 10 min  premod applied during manual therapy     Charges:  Timed Code Treatment Minutes: 45   Total Treatment Minutes: 55       [] EVAL (LOW) 34213 (typically 20 minutes face-to-face)  [] EVAL (MOD) 48194 (typically 30 minutes face-to-face)  [] EVAL (HIGH) 41843 (typically 45 minutes face-to-face)  [] RE-EVAL     [x] PK(25901) x   1  [] IONTO (49159)  [] NMR (93552) x     [x] VASO (51099)  [x] Manual (30338) x  2   [] Other:  [] TA (30128)x     [] Mech Traction (42438)  [] ES(attended) (47704)     [] ES (un) (68504):       GOALS:  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 Progressing: []? Met: []? Not Met: []? Adjusted     Therapist goals for Patient:   Short Term Goals: To be achieved in: 2 weeks  1. Independent in HEP and progression per patient tolerance, in order to prevent re-injury. []? Progressing: []? Met: []? Not Met: []? Adjusted  2. Patient will have a decrease in pain to facilitate improvement in movement, function, and ADLs as indicated by Functional Deficits. []? Progressing: []? Met: []? Not Met: []? Adjusted     Long Term Goals: To be achieved in: 8-12 weeks  1. Disability index score of 20% or less for the DASH to assist with reaching prior level of function. []? Progressing: []? Met: []?  Not Met: []? Adjusted  2. Patient will demonstrate increased AROM to [facility] to allow for proper joint functioning as indicated by patients Functional Deficits. []? Progressing: []? Met: []? Not Met: []? Adjusted  3. Patient will demonstrate an increase in Strength to within 5lbs of contralateral shoulder to allow for proper functional mobility as indicated by patients Functional Deficits. []? Progressing: []? Met: []? Not Met: []? Adjusted  4. Patient will return to bathing/dressing/ADLs without increased symptoms or restriction. []? Progressing: []? Met: []? Not Met: []? Adjusted  5. Pt will tolerate reaching OH without increased symptoms    []? Progressing: []? Met: []? Not Met: []? Adjusted    Progression Towards Functional goals:  [] Patient is progressing as expected towards functional goals listed. [] Progression is slowed due to complexities listed. [] Progression has been slowed due to co-morbidities. [x] Plan just implemented, too soon to assess goals progression  [] Other:     ASSESSMENT:   PROM to 90 deg flexion and abd today, ER to 5 deg. Continues to be limited by pain and guarding. Attempted a few AAROM exercises today with poor tolerance, so some progressions abandoned. Treatment/Activity Tolerance:  [x] Patient tolerated treatment well [] Patient limited by fatique  [x] Patient limited by pain/ guarding with manual therapy   Patient limited by other medical complications  [] Other:     Overall Progression Towards Functional goals/ Treatment Progress Update:  [] Patient is progressing as expected towards functional goals listed. [] Progression is slowed due to complexities/Impairments listed. [] Progression has been slowed due to co-morbidities.   [x] Plan just implemented, too soon to assess goals progression <30days   [] Goals require adjustment due to lack of progress  [] Patient is not progressing as expected and requires additional follow up with physician  [] Other    Prognosis for POC: [x] Good [] Fair  [] Poor    Patient requires continued skilled intervention: [x] Yes  [] No      PLAN: Progress ROM with coming out of sling while seated, plans to purchase a TENS unit for pain. [x] Continue per plan of care [] Alter current plan (see comments)  [] Plan of care initiated [] Hold pending MD visit [] Discharge    Electronically signed by: Luzmaria Brown PT, DPT    Note: If patient does not return for scheduled/recommended follow up visits, this note will serve as a discharge from care along with the most recent update on progress.

## 2022-01-13 ENCOUNTER — HOSPITAL ENCOUNTER (OUTPATIENT)
Dept: PHYSICAL THERAPY | Age: 67
Setting detail: THERAPIES SERIES
Discharge: HOME | End: 2022-01-13
Payer: MEDICARE

## 2022-01-13 PROCEDURE — 97140 MANUAL THERAPY 1/> REGIONS: CPT | Performed by: SPECIALIST/TECHNOLOGIST

## 2022-01-13 PROCEDURE — 97110 THERAPEUTIC EXERCISES: CPT | Performed by: SPECIALIST/TECHNOLOGIST

## 2022-01-13 PROCEDURE — 97016 VASOPNEUMATIC DEVICE THERAPY: CPT | Performed by: SPECIALIST/TECHNOLOGIST

## 2022-01-13 NOTE — FLOWSHEET NOTE
[facility] [employer]    Physical Therapy Treatment Note/ Progress Report:     Date:  2022    Patient Name:  Selin Segura    :  1955  MRN: [id number]  Medical/Treatment Diagnosis Information:  · Diagnosis: s/p L shoulder rotator cuff repair 21  · Treatment Diagnosis: L shoulder pain G50.140  Insurance/Certification information:  PT Insurance Information: Medicare  Physician Information:  Referring Practitioner: Quinn Khan DO  Plan of care signed (Y/N): Pending    Date of Patient follow up with Physician: Delores Lemons 22     Progress Report: []  Yes  [x]  No     Functional Scale:   21 SPADI = 86% disability    Date Range for reporting period:  Beginnin21  Ending:      Progress report due (10 Rx/or 30 days whichever is less):      Recertification due (POC duration/ or 90 days whichever is less): 3/21/22     Visit # Insurance Allowable Auth Needed   2022  Medicare []Yes    []No     Pain level: 5/10     SUBJECTIVE:  Has been using his TENS machine and his wife has been massaging the hematoma and it seems to be a lot better. Pt reports Left shoulder being more sore today related to not wearing the sling as much. Pt doing HEP about 2x/ day with pulleys and cane @ home    OBJECTIVE:  4 weeks post op     Observation:    Test measurements:      PROM (L) Shoulder:  75 deg with table slide  22 Left shoulder with palpable ST nodule with hematoma present over lateral arm deltoid into central arm. After STM this was able to move with decreased guarding with PROM. PROM flexion ~90 today w/ TENS unit and after STM   Left arm less hematoma present over biceps & central arm.  PROM ~ 100 flexion ~ 120 Abd  ER ~20 @ 30/ IR very stiff and guarded with empty end feel    RESTRICTIONS/PRECAUTIONS: large RCR, sAD, DCE -  DOS 21    Exercises/Interventions:  25'  Therapeutic Ex   Wt / Resistance Sets/sec Reps Notes pulleys  5 min  Guarded posture   Wand press attempted      scap retraction  2 10    Shoulder rolls  2 10    Wand ER 5\"  10 3X 10 @ hep   Table slides  Flexion   1 15x    Pendulums Fwd back, CW, CCW  x20 a Reviewed 12/30   scap clocks  1 20 12-6  3-9          Bent over codman row  2 10    submax iso; flex and IR  10\" 10x reinstructed mechanics                                         Therapeutic Activities        [id number]  1/13   [employer]                                                               Manual Intervention x 25'       Shoulder PROM Gentle oscillations, flex, ER, abd within appropriate post op restrictions 25 min  Significant guarding noted initially. Improved with oscillations and cueing  TENS unit applied during PROM and supported with pillows   Post Cap mobs       Thoracic/Rib manipulation       CT MT/Mobs       PROM MT                     NMR re-education                                                                   Therapeutic Exercise and NMR EXR  [x] (41311) Provided verbal/tactile cueing for activities related to strengthening, flexibility, endurance, ROM  for improvements in scapular, scapulothoracic and UE control with self care, reaching, carrying, lifting, house/yardwork, driving/computer work.    [] (41388) Provided verbal/tactile cueing for activities related to improving balance, coordination, kinesthetic sense, posture, motor skill, proprioception  to assist with  scapular, scapulothoracic and UE control with self care, reaching, carrying, lifting, house/yardwork, driving/computer work. Therapeutic Activities:    [x] (43006 or 22617) Provided verbal/tactile cueing for activities related to improving balance, coordination, kinesthetic sense, posture, motor skill, proprioception and motor activation to allow for proper function of scapular, scapulothoracic and UE control with self care, carrying, lifting, driving/computer work.      Home Exercise Program:    [x] (25880) Reviewed/Progressed HEP activities related to strengthening, flexibility, endurance, ROM of scapular, scapulothoracic and UE control with self care, reaching, carrying, lifting, house/yardwork, driving/computer work  [] (57646) Reviewed/Progressed HEP activities related to improving balance, coordination, kinesthetic sense, posture, motor skill, proprioception of scapular, scapulothoracic and UE control with self care, reaching, carrying, lifting, house/yardwork, driving/computer work      Manual Treatments:  PROM / STM / Oscillations-Mobs:  G-I, II, III, IV (PA's, Inf., Post.)  [x] (73354) Provided manual therapy to mobilize soft tissue/joints of cervical/CT, scapular GHJ and UE for the purpose of modulating pain, promoting relaxation,  increasing ROM, reducing/eliminating soft tissue swelling/inflammation/restriction, improving soft tissue extensibility and allowing for proper ROM for normal function with self care, reaching, carrying, lifting, house/yardwork, driving/computer work    Modalities:  Game ready for post op swelling x 10 min  premod applied during manual therapy     Charges:  Timed Code Treatment Minutes: 45   Total Treatment Minutes: 55       [] EVAL (LOW) 60137 (typically 20 minutes face-to-face)  [] EVAL (MOD) 03754 (typically 30 minutes face-to-face)  [] EVAL (HIGH) 36323 (typically 45 minutes face-to-face)  [] RE-EVAL     [x] RD(52844) x   1  [] IONTO (49707)  [] NMR (26279) x     [x] VASO (51310)  [x] Manual (94438) x  2   [] Other:  [] TA (71273)x     [] Mech Traction (62721)  [] ES(attended) (08011)     [] ES (un) (36563):       GOALS:  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 Progressing: []? Met: []? Not Met: []? Adjusted     Therapist goals for Patient:   Short Term Goals: To be achieved in: 2 weeks  1. Independent in HEP and progression per patient tolerance, in order to prevent re-injury. []? Progressing: []? Met: []? Not Met: []? Adjusted  2.  Patient will have a decrease in pain to facilitate improvement in movement, function, and ADLs as indicated by Functional Deficits. []? Progressing: []? Met: []? Not Met: []? Adjusted     Long Term Goals: To be achieved in: 8-12 weeks  1. Disability index score of 20% or less for the DASH to assist with reaching prior level of function. []? Progressing: []? Met: []? Not Met: []? Adjusted  2. Patient will demonstrate increased AROM to [facility] to allow for proper joint functioning as indicated by patients Functional Deficits. []? Progressing: []? Met: []? Not Met: []? Adjusted  3. Patient will demonstrate an increase in Strength to within 5lbs of contralateral shoulder to allow for proper functional mobility as indicated by patients Functional Deficits. []? Progressing: []? Met: []? Not Met: []? Adjusted  4. Patient will return to bathing/dressing/ADLs without increased symptoms or restriction. []? Progressing: []? Met: []? Not Met: []? Adjusted  5. Pt will tolerate reaching OH without increased symptoms    []? Progressing: []? Met: []? Not Met: []? Adjusted    Progression Towards Functional goals:  [] Patient is progressing as expected towards functional goals listed. [] Progression is slowed due to complexities listed. [] Progression has been slowed due to co-morbidities. [x] Plan just implemented, too soon to assess goals progression  [] Other:     ASSESSMENT:   PROM to above 90 deg flexion and abd today, ER to 15 deg @ about 30. Continues to be limited by pain and guarding while using his TENS unit throughout. Some improvements made ROM with pulleys, flexion wall slides etc. Pt reports increased pain throughout left shoulder with PROM especially with ER / IR emphasis.        Treatment/Activity Tolerance:  [x] Patient tolerated treatment well [] Patient limited by fatique  [x] Patient limited by pain/ guarding with manual therapy   Patient limited by other medical complications  [] Other:     Overall Progression Towards Functional goals/ Treatment Progress Update:  [] Patient is progressing as expected towards functional goals listed. [] Progression is slowed due to complexities/Impairments listed. [] Progression has been slowed due to co-morbidities. [x] Plan just implemented, too soon to assess goals progression <30days   [] Goals require adjustment due to lack of progress  [] Patient is not progressing as expected and requires additional follow up with physician  [] Other    Prognosis for POC: [x] Good [] Fair  [] Poor    Patient requires continued skilled intervention: [x] Yes  [] No      PLAN: Progress ROM with coming out of sling while seated, plans to purchase a TENS unit for pain. Dr Colonel Melva MERA contacted today to discuss Left shoulder stiffness and guarding. Poss referral for visit and NSAIDS per [facility] 1/13  [x] Continue per plan of care [] Alter current plan (see comments)  [] Plan of care initiated [] Hold pending MD visit [] Discharge    Electronically signed by: Chandra Donohue, PTA, [id number]    Note: If patient does not return for scheduled/recommended follow up visits, this note will serve as a discharge from care along with the most recent update on progress.

## 2022-01-14 ENCOUNTER — HOSPITAL ENCOUNTER (OUTPATIENT)
Dept: PHYSICAL THERAPY | Age: 67
Setting detail: THERAPIES SERIES
Discharge: HOME | End: 2022-01-14
Payer: MEDICARE

## 2022-01-14 PROCEDURE — 97140 MANUAL THERAPY 1/> REGIONS: CPT

## 2022-01-14 PROCEDURE — 97110 THERAPEUTIC EXERCISES: CPT

## 2022-01-14 PROCEDURE — 97016 VASOPNEUMATIC DEVICE THERAPY: CPT

## 2022-01-14 NOTE — FLOWSHEET NOTE
[facility]    Physical Therapy Treatment Note/ Progress Report:     Date:  2022    Patient Name:  Geoffrey Kamara    :  1955  MRN: [id number]  Medical/Treatment Diagnosis Information:  · Diagnosis: s/p L shoulder rotator cuff repair 21  · Treatment Diagnosis: L shoulder pain I09.433  Insurance/Certification information:  PT Insurance Information: Medicare  Physician Information:  Referring Practitioner: Lan Kamara DO  Plan of care signed (Y/N): Pending    Date of Patient follow up with Physician: Kelvin Verdin 22     Progress Report: []  Yes  [x]  No     Functional Scale:   21 SPADI = 86% disability    Date Range for reporting period:  Beginnin21  Ending:      Progress report due (10 Rx/or 30 days whichever is less):      Recertification due (POC duration/ or 90 days whichever is less): 3/21/22     Visit # Insurance Allowable Auth Needed   2022  Medicare []Yes    []No     Pain level: 5/10     SUBJECTIVE:  Pt notes that he did all of his exercises already this morning. Did not hear anything from Dr. Kelvin Verdin' office yet. OBJECTIVE:  4 weeks post op     Observation:    Test measurements:      PROM (L) Shoulder:  75 deg with table slide  22 Left shoulder with palpable ST nodule with hematoma present over lateral arm deltoid into central arm. After STM this was able to move with decreased guarding with PROM. PROM flexion ~90 today w/ TENS unit and after STM   Left arm less hematoma present over biceps & central arm.  PROM ~ 100 flexion ~ 120 Abd  ER ~20 @ 30/ IR very stiff and guarded with empty end feel    RESTRICTIONS/PRECAUTIONS: large RCR, sAD, DCE -  DOS 21    Exercises/Interventions:  25'  Therapeutic Ex   Wt / Resistance Sets/sec Reps Notes   pulleys  5 min  Guarded posture   Wand press attempted      scap retraction  2 10    Shoulder rolls  2 10    Wand ER 5\"  10 3X 10 @ hep   Table slides  Flexion   1 15x    Pendulums Fwd back, CW, CCW  x20 a Reviewed 12/30   scap clocks  1 20 12-6  3-9          Bent over codman row  2 10    submax iso; flex and IR  10\" 10x reinstructed mechanics                                         Therapeutic Activities        [id number]  1/13   [employer]                                                               Manual Intervention x 25'       Shoulder PROM Gentle oscillations, flex, ER, abd within appropriate post op restrictions 25 min  Significant guarding noted initially. Improved with oscillations and cueing  TENS unit applied during PROM and supported with pillows   Post Cap mobs       Thoracic/Rib manipulation       CT MT/Mobs       PROM MT                     NMR re-education                                                                   Therapeutic Exercise and NMR EXR  [x] (10373) Provided verbal/tactile cueing for activities related to strengthening, flexibility, endurance, ROM  for improvements in scapular, scapulothoracic and UE control with self care, reaching, carrying, lifting, house/yardwork, driving/computer work.    [] (54463) Provided verbal/tactile cueing for activities related to improving balance, coordination, kinesthetic sense, posture, motor skill, proprioception  to assist with  scapular, scapulothoracic and UE control with self care, reaching, carrying, lifting, house/yardwork, driving/computer work. Therapeutic Activities:    [x] (81196 or 35301) Provided verbal/tactile cueing for activities related to improving balance, coordination, kinesthetic sense, posture, motor skill, proprioception and motor activation to allow for proper function of scapular, scapulothoracic and UE control with self care, carrying, lifting, driving/computer work.      Home Exercise Program:    [x] (96104) Reviewed/Progressed HEP activities related to strengthening, flexibility, endurance, ROM of scapular, scapulothoracic and UE control with self care, reaching, carrying, lifting, house/yardwork, driving/computer work  [] (52508) Reviewed/Progressed HEP activities related to improving balance, coordination, kinesthetic sense, posture, motor skill, proprioception of scapular, scapulothoracic and UE control with self care, reaching, carrying, lifting, house/yardwork, driving/computer work      Manual Treatments:  PROM / STM / Oscillations-Mobs:  G-I, II, III, IV (PA's, Inf., Post.)  [x] (22595) Provided manual therapy to mobilize soft tissue/joints of cervical/CT, scapular GHJ and UE for the purpose of modulating pain, promoting relaxation,  increasing ROM, reducing/eliminating soft tissue swelling/inflammation/restriction, improving soft tissue extensibility and allowing for proper ROM for normal function with self care, reaching, carrying, lifting, house/yardwork, driving/computer work    Modalities:  Game ready for post op swelling x 10 min  premod applied during manual therapy     Charges:  Timed Code Treatment Minutes: 45   Total Treatment Minutes: 55       [] EVAL (LOW) 88400 (typically 20 minutes face-to-face)  [] EVAL (MOD) 35973 (typically 30 minutes face-to-face)  [] EVAL (HIGH) 70594 (typically 45 minutes face-to-face)  [] RE-EVAL     [x] JV(35895) x   1  [] IONTO (54319)  [] NMR (44824) x     [x] VASO (84533)  [x] Manual (15614) x  2   [] Other:  [] TA (03198)x     [] Mech Traction (98580)  [] ES(attended) (19914)     [] ES (un) (09317):       GOALS:  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 Progressing: []? Met: []? Not Met: []? Adjusted     Therapist goals for Patient:   Short Term Goals: To be achieved in: 2 weeks  1. Independent in HEP and progression per patient tolerance, in order to prevent re-injury. []? Progressing: []? Met: []? Not Met: []? Adjusted  2. Patient will have a decrease in pain to facilitate improvement in movement, function, and ADLs as indicated by Functional Deficits. []? Progressing: []? Met: []?  Not Met: []? Adjusted     Long Term Goals: To be achieved in: 8-12 weeks  1. Disability index score of 20% or less for the DASH to assist with reaching prior level of function. []? Progressing: []? Met: []? Not Met: []? Adjusted  2. Patient will demonstrate increased AROM to [facility] to allow for proper joint functioning as indicated by patients Functional Deficits. []? Progressing: []? Met: []? Not Met: []? Adjusted  3. Patient will demonstrate an increase in Strength to within 5lbs of contralateral shoulder to allow for proper functional mobility as indicated by patients Functional Deficits. []? Progressing: []? Met: []? Not Met: []? Adjusted  4. Patient will return to bathing/dressing/ADLs without increased symptoms or restriction. []? Progressing: []? Met: []? Not Met: []? Adjusted  5. Pt will tolerate reaching OH without increased symptoms    []? Progressing: []? Met: []? Not Met: []? Adjusted    Progression Towards Functional goals:  [x] Patient is progressing as expected towards functional goals listed. [] Progression is slowed due to complexities listed. [] Progression has been slowed due to co-morbidities. [] Plan just implemented, too soon to assess goals progression  [] Other:     ASSESSMENT:    Pt more guarded and tight today, barely able to get 100 deg flexion and abduction due to pain behavior and guarding. Nicholas' office made aware of patient progress thus far. Treatment/Activity Tolerance:  [x] Patient tolerated treatment well [] Patient limited by fatique  [x] Patient limited by pain/ guarding with manual therapy   Patient limited by other medical complications  [] Other:     Overall Progression Towards Functional goals/ Treatment Progress Update:  [] Patient is progressing as expected towards functional goals listed. [] Progression is slowed due to complexities/Impairments listed. [] Progression has been slowed due to co-morbidities.   [x] Plan just implemented, too soon to assess goals progression <30days   [] Goals require adjustment due to lack of progress  [] Patient is not progressing as expected and requires additional follow up with physician  [] Other    Prognosis for POC: [x] Good [] Fair  [] Poor    Patient requires continued skilled intervention: [x] Yes  [] No      PLAN: Progress ROM with coming out of sling while seated, plans to purchase a TENS unit for pain. Dr Elizabeth Shoemaker MA contacted today to discuss Left shoulder stiffness and guarding. Poss referral for visit and NSAIDS per Zhao Hodgson 1/13  [x] Continue per plan of care [] Alter current plan (see comments)  [] Plan of care initiated [] Hold pending MD visit [] Discharge    Electronically signed by: Yao Dowd, PT, DPT    Note: If patient does not return for scheduled/recommended follow up visits, this note will serve as a discharge from care along with the most recent update on progress.

## 2022-01-18 ENCOUNTER — HOSPITAL ENCOUNTER (OUTPATIENT)
Dept: PHYSICAL THERAPY | Age: 67
Setting detail: THERAPIES SERIES
Discharge: HOME | End: 2022-01-18
Payer: MEDICARE

## 2022-01-18 PROCEDURE — 97110 THERAPEUTIC EXERCISES: CPT | Performed by: SPECIALIST/TECHNOLOGIST

## 2022-01-18 PROCEDURE — 97140 MANUAL THERAPY 1/> REGIONS: CPT | Performed by: SPECIALIST/TECHNOLOGIST

## 2022-01-18 NOTE — FLOWSHEET NOTE
[facility]    Physical Therapy Treatment Note/ Progress Report:     Date:  2022    Patient Name:  Maranda Mcdonald    :  1955  MRN: [id number]  Medical/Treatment Diagnosis Information:  Diagnosis: s/p L shoulder rotator cuff repair 21  Treatment Diagnosis: L shoulder pain Q86.424  Insurance/Certification information:  PT Insurance Information: Medicare  Physician Information:  Referring Practitioner: Tod Adame DO  Plan of care signed (Y/N): Pending    Date of Patient follow up with Physician: Tarik Lopez 22     Progress Report: []  Yes  [x]  No     Functional Scale:   21 SPADI = 86% disability    Date Range for reporting period:  Beginnin21  Ending:      Progress report due (10 Rx/or 30 days whichever is less):      Recertification due (POC duration/ or 90 days whichever is less): 3/21/22     Visit # Insurance Allowable Auth Needed   11  6 in    Medicare []Yes    []No     Pain level: 5/10     SUBJECTIVE: 5 weeks today s/p but only in therapy for 4 weeks. Feels likes hes making progress  But is still very slow and sess Dr Tarik Lopez tomorrow but has continued with his HEP and using his Tens unit to manage his symptoms. Icing regularly but admits could ice more. OBJECTIVE:  4 weeks post op    Observation:   Test measurements:      PROM (L) Shoulder:  75 deg with table slide  22 Left shoulder with palpable ST nodule with hematoma present over lateral arm deltoid into central arm. After STM this was able to move with decreased guarding with PROM. PROM flexion ~90 today w/ TENS unit and after STM   Left arm less hematoma present over biceps & central arm.  PROM ~ 100 flexion ~ 120 Abd  ER ~20 @ 30/ IR very stiff and guarded with empty end feel     SPADI        Pain 41 points 82%     Disability scale 71 points 88%    ROM Left Right   Shoulder Flex 118* 149   Shoulder scaption 145 167   Shoulder ER 32* 60   Shoulder IR 55 65                 RESTRICTIONS/PRECAUTIONS: large RCR, sAD, DCE -  DOS 12/14/21    Exercises/Interventions:  25'  Therapeutic Ex   Wt / Resistance Sets/sec Reps Notes   pulleys  5 min     cane  Flexion in supine   Cane ER @ 30  15\"  10\" 5x  10x 1/18   scap retraction  2 10    Shoulder rolls  2 10    Wand ER seated  5\"  10 1/18 reviewed   Table slides  Flexion   1 10x 1/18   Pendulums Fwd back, CW, CCW  x20 a    scap clocks  1 20 12-6  3-9          Bent over codman row  2 10    submax iso; flex and IR  10\" 10x reinstructed mechanics   Finger walks up ladder                                      Therapeutic Activities        [id number]  1/13   [employer]    1/18 updated   Updated measurements taken today     1/18                                                    Manual Intervention x 25'       Shoulder PROM Gentle oscillations, flex, ER, abd within appropriate post op restrictions 25 min  Significant guarding noted initially. Improved with oscillations and cueing  TENS unit applied during PROM and supported with pillows   Post Cap mobs       Thoracic/Rib manipulation       CT MT/Mobs       PROM MT                     NMR re-education                                                                   Therapeutic Exercise and NMR EXR  [x] (68498) Provided verbal/tactile cueing for activities related to strengthening, flexibility, endurance, ROM  for improvements in scapular, scapulothoracic and UE control with self care, reaching, carrying, lifting, house/yardwork, driving/computer work.    [] (83802) Provided verbal/tactile cueing for activities related to improving balance, coordination, kinesthetic sense, posture, motor skill, proprioception  to assist with  scapular, scapulothoracic and UE control with self care, reaching, carrying, lifting, house/yardwork, driving/computer work.     Therapeutic Activities:    [x] (12472 or ) Provided verbal/tactile cueing for activities related to improving balance, coordination, kinesthetic sense, posture, motor skill, proprioception and motor activation to allow for proper function of scapular, scapulothoracic and UE control with self care, carrying, lifting, driving/computer work.      Home Exercise Program:    [x] (51131) Reviewed/Progressed HEP activities related to strengthening, flexibility, endurance, ROM of scapular, scapulothoracic and UE control with self care, reaching, carrying, lifting, house/yardwork, driving/computer work  [] (28564) Reviewed/Progressed HEP activities related to improving balance, coordination, kinesthetic sense, posture, motor skill, proprioception of scapular, scapulothoracic and UE control with self care, reaching, carrying, lifting, house/yardwork, driving/computer work      Manual Treatments:  PROM / STM / Oscillations-Mobs:  G-I, II, III, IV (PA's, Inf., Post.)  [x] (88007) Provided manual therapy to mobilize soft tissue/joints of cervical/CT, scapular GHJ and UE for the purpose of modulating pain, promoting relaxation,  increasing ROM, reducing/eliminating soft tissue swelling/inflammation/restriction, improving soft tissue extensibility and allowing for proper ROM for normal function with self care, reaching, carrying, lifting, house/yardwork, driving/computer work    Modalities:  Game ready for post op swelling x 10 min  premod applied during manual therapy     Charges:  Timed Code Treatment Minutes: 45   Total Treatment Minutes: 55       [] EVAL (LOW) 08658 (typically 20 minutes face-to-face)  [] EVAL (MOD) 57243 (typically 30 minutes face-to-face)  [] EVAL (HIGH) 86181 (typically 45 minutes face-to-face)  [] RE-EVAL     [x] KQ(32416) x   1  [] IONTO (08466)  [] NMR (97930) x     [x] VASO (19435)  [x] Manual (48506) x  2   [] Other:  [] TA (22721)x     [] Mech Traction (76906)  [] ES(attended) (48115)     [] ES (un) (12616):       GOALS:  Patient stated goal: return to woodworking  [] Progressing: [] Met: [] Not Met: [] Adjusted     Therapist goals for Patient:   Short Term Goals: To be achieved in: 2 weeks  1. Independent in HEP and progression per patient tolerance, in order to prevent re-injury. [] Progressing: [] Met: [] Not Met: [] Adjusted  2. Patient will have a decrease in pain to facilitate improvement in movement, function, and ADLs as indicated by Functional Deficits. [] Progressing: [] Met: [] Not Met: [] Adjusted     Long Term Goals: To be achieved in: 8-12 weeks  1. Disability index score of 20% or less for the DASH to assist with reaching prior level of function. [] Progressing: [] Met: [] Not Met: [] Adjusted  2. Patient will demonstrate increased AROM to [facility] to allow for proper joint functioning as indicated by patients Functional Deficits. [] Progressing: [] Met: [] Not Met: [] Adjusted  3. Patient will demonstrate an increase in Strength to within 5lbs of contralateral shoulder to allow for proper functional mobility as indicated by patients Functional Deficits. [] Progressing: [] Met: [] Not Met: [] Adjusted  4. Patient will return to bathing/dressing/ADLs without increased symptoms or restriction. [] Progressing: [] Met: [] Not Met: [] Adjusted  5. Pt will tolerate reaching OH without increased symptoms    [] Progressing: [] Met: [] Not Met: [] Adjusted    Progression Towards Functional goals:  [x] Patient is progressing as expected towards functional goals listed. [] Progression is slowed due to complexities listed. [] Progression has been slowed due to co-morbidities. [] Plan just implemented, too soon to assess goals progression  [] Other:     ASSESSMENT:   Pt demonstrated some improvements in PROM today and measurements but is still struggling with pain management while wearing his Tens unit. Pt is most painful with OH flexion, ER but has fair capsular mobility. Pt advised to increase icing at home and continue with HEP 3x/day.  Pt is still keeping arm glued to side and not using with any swing during ambulation. Overall progress has been slow due to high pain levels and increased guarding of the LUE. Treatment/Activity Tolerance:  [x] Patient tolerated treatment well [] Patient limited by fatique  [x] Patient limited by pain/ guarding with manual therapy   Patient limited by other medical complications  [] Other:     Overall Progression Towards Functional goals/ Treatment Progress Update:  [] Patient is progressing as expected towards functional goals listed. [] Progression is slowed due to complexities/Impairments listed. [] Progression has been slowed due to co-morbidities. [x] Plan just implemented, too soon to assess goals progression <30days   [] Goals require adjustment due to lack of progress  [] Patient is not progressing as expected and requires additional follow up with physician  [] Other    Prognosis for POC: [x] Good [] Fair  [] Poor    Patient requires continued skilled intervention: [x] Yes  [] No      PLAN: Progress ROM with HEP and maintain less guarding with all stretches. Dr Kaushal Quintero MA last week to discuss Left shoulder stiffness and guarding. [x] Continue per plan of care [] Alter current plan (see comments)  [] Plan of care initiated [] Hold pending MD visit [] Discharge    Electronically signed by: Mamta Field, PTA, [id number]  Note: If patient does not return for scheduled/recommended follow up visits, this note will serve as a discharge from care along with the most recent update on progress.

## 2022-01-20 ENCOUNTER — HOSPITAL ENCOUNTER (OUTPATIENT)
Dept: PHYSICAL THERAPY | Age: 67
Setting detail: THERAPIES SERIES
Discharge: HOME | End: 2022-01-20
Payer: MEDICARE

## 2022-01-20 NOTE — PROGRESS NOTES
Physical Therapy    [place]    Physical Therapy  Cancellation/No-show Note  Patient Name:  May Shaver  :  1955   Date:  2022  Cancelled visits to date:   No-shows to date: 0    For today's appointment patient:  [x]  Cancelled  []  Rescheduled appointment  []  No-show     Reason given by patient:  [x]  Patient ill started with symptoms yesterday  []  Conflicting appointment  []  No transportation    []  Conflict with work  []  No reason given  []  Other:     Comments:      Phone call information:   []  Phone call made today to patient at _ time at number provided:      []  Patient answered, conversation as follows:    []  Patient did not answer, message left as follows:  []  Phone call not made today  [x]  Phone call not needed - pt contacted us to cancel and provided reason for cancellation.      Electronically signed by:  Richard Teresa, [address], [id number]

## 2022-01-21 ENCOUNTER — HOSPITAL ENCOUNTER (OUTPATIENT)
Dept: PHYSICAL THERAPY | Age: 67
Setting detail: THERAPIES SERIES
End: 2022-01-21
Payer: MEDICARE

## 2022-01-25 ENCOUNTER — APPOINTMENT (OUTPATIENT)
Dept: PHYSICAL THERAPY | Age: 67
End: 2022-01-25
Payer: MEDICARE

## 2022-01-27 ENCOUNTER — APPOINTMENT (OUTPATIENT)
Dept: PHYSICAL THERAPY | Age: 67
End: 2022-01-27
Payer: MEDICARE

## 2022-01-28 ENCOUNTER — APPOINTMENT (OUTPATIENT)
Dept: PHYSICAL THERAPY | Age: 67
End: 2022-01-28
Payer: MEDICARE

## 2022-02-01 ENCOUNTER — HOSPITAL ENCOUNTER (OUTPATIENT)
Dept: PHYSICAL THERAPY | Age: 67
Setting detail: THERAPIES SERIES
Discharge: HOME | End: 2022-02-01
Payer: MEDICARE

## 2022-02-01 PROCEDURE — 97016 VASOPNEUMATIC DEVICE THERAPY: CPT

## 2022-02-01 PROCEDURE — 97110 THERAPEUTIC EXERCISES: CPT

## 2022-02-01 PROCEDURE — 97140 MANUAL THERAPY 1/> REGIONS: CPT

## 2022-02-01 NOTE — FLOWSHEET NOTE
[facility] [employer]    Physical Therapy Treatment Note/ Progress Report:     Date:  2022    Patient Name:  Sidney Anand    :  1955  MRN: [id number]  Medical/Treatment Diagnosis Information:  · Diagnosis: s/p L shoulder rotator cuff repair 21  · Treatment Diagnosis: L shoulder pain G78.804  Insurance/Certification information:  PT Insurance Information: Medicare  Physician Information:  Referring Practitioner: Bibi Ding DO  Plan of care signed (Y/N): Pending    Date of Patient follow up with Physician: Jewel Srivastava 22     Progress Report: []  Yes  [x]  No     Functional Scale:   21 SPADI = 86% disability  22 SPADI = 86% disability    Date Range for reporting period:  Beginnin21  Endin22    Progress report due (10 Rx/or 30 days whichever is less): 90     Recertification due (POC duration/ or 90 days whichever is less): 3/21/22     Visit # Insurance Allowable Auth Needed   12  7 in    Medicare []Yes    []No     Pain level: 5/10     SUBJECTIVE: Pt returns after being out for 2 weeks with COVID. Has been stretching at home regularly. Does note that he was having GI pain from advil so he has stopped taking that and is now only taking tylenol. OBJECTIVE:  7 weeks post op   Observation:    Test measurements:      PROM (L) Shoulder:  75 deg with table slide  22 Left shoulder with palpable ST nodule with hematoma present over lateral arm deltoid into central arm. After STM this was able to move with decreased guarding with PROM. PROM flexion ~90 today w/ TENS unit and after STM   Left arm less hematoma present over biceps & central arm.  PROM ~ 100 flexion ~ 120 Abd  ER ~20 @ 30/ IR very stiff and guarded with empty end feel     ROM Left Right   Shoulder Flex 118* 149   Shoulder scaption 145 167   Shoulder ER 32* 60   Shoulder IR 55 65             22: Shoulder PROM - flexion 110 deg, abd 90 deg, ER 35 deg      RESTRICTIONS/PRECAUTIONS: large RCR, sAD, DCE -  DOS 12/14/21    Exercises/Interventions:   Therapeutic Ex  20 min Wt / Resistance Sets/sec Reps Notes   pulleys  5 min     Hammock stretch  2 min     cane  Flexion in supine   Cane ER @ 30  15\"  10\" 5x  10x 1/18   scap retraction  2 10    Shoulder rolls  2 10    Wand ER seated  5\"  10 1/18 reviewed   Table slides  Flexion   1 10x 1/18   Pendulums Fwd back, CW, CCW  x20 a    scap clocks  1 20 12-6  3-9          Bent over Literably row  2 10    submax iso; flex and IR  10\" 10x reinstructed mechanics   Finger walks up ladder       HBB stretch  10\" 10x                            Therapeutic Activities        [id number]  1/13   [employer]    1/18 updated                                                           Manual Intervention x 25'       Shoulder PROM  20 min     [address] mobs, posterior and inferior  5 min Gr IV    Thoracic/Rib manipulation       CT MT/Mobs       PROM MT                     NMR re-education                                                                   Therapeutic Exercise and NMR EXR  [x] (62948) Provided verbal/tactile cueing for activities related to strengthening, flexibility, endurance, ROM  for improvements in scapular, scapulothoracic and UE control with self care, reaching, carrying, lifting, house/yardwork, driving/computer work.    [] (72921) Provided verbal/tactile cueing for activities related to improving balance, coordination, kinesthetic sense, posture, motor skill, proprioception  to assist with  scapular, scapulothoracic and UE control with self care, reaching, carrying, lifting, house/yardwork, driving/computer work.     Therapeutic Activities:    [x] (11977 or 82425) Provided verbal/tactile cueing for activities related to improving balance, coordination, kinesthetic sense, posture, motor skill, proprioception and motor activation to allow for proper function of scapular, scapulothoracic and UE control with self care, carrying, lifting, driving/computer work. Home Exercise Program:    [x] (97812) Reviewed/Progressed HEP activities related to strengthening, flexibility, endurance, ROM of scapular, scapulothoracic and UE control with self care, reaching, carrying, lifting, house/yardwork, driving/computer work  [] (38004) Reviewed/Progressed HEP activities related to improving balance, coordination, kinesthetic sense, posture, motor skill, proprioception of scapular, scapulothoracic and UE control with self care, reaching, carrying, lifting, house/yardwork, driving/computer work      Manual Treatments:  PROM / STM / Oscillations-Mobs:  G-I, II, III, IV (PA's, Inf., Post.)  [x] (34901) Provided manual therapy to mobilize soft tissue/joints of cervical/CT, scapular GHJ and UE for the purpose of modulating pain, promoting relaxation,  increasing ROM, reducing/eliminating soft tissue swelling/inflammation/restriction, improving soft tissue extensibility and allowing for proper ROM for normal function with self care, reaching, carrying, lifting, house/yardwork, driving/computer work    Modalities:  Game ready for post op swelling x 10 min       Charges:  Timed Code Treatment Minutes: 45   Total Treatment Minutes: 55       [] EVAL (LOW) 09154 (typically 20 minutes face-to-face)  [] EVAL (MOD) 59247 (typically 30 minutes face-to-face)  [] EVAL (HIGH) 62514 (typically 45 minutes face-to-face)  [] RE-EVAL     [x] KV(75528) x   1  [] IONTO (68443)  [] NMR (68309) x     [x] VASO (22319)  [x] Manual (22185) x  2   [] Other:  [] TA (39539)x     [] Mech Traction (68999)  [] ES(attended) (50605)     [] ES (un) (11932):       GOALS:  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 Independent in HEP and progression per patient tolerance, in order to prevent re-injury.    [] Progressing: [x] Met: [] Not Met: [] Adjusted  2. Patient will have a decrease in pain to facilitate improvement in movement, function, and ADLs as indicated by Functional Deficits. [x] Progressing: [] Met: [] Not Met: [] Adjusted     Long Term Goals: To be achieved in: 8-12 weeks  1. Disability index score of 20% or less for the DASH to assist with reaching prior level of function. [x] Progressing: [] Met: [] Not Met: [] Adjusted  2. Patient will demonstrate increased AROM to [facility] to allow for proper joint functioning as indicated by patients Functional Deficits. [x] Progressing: [] Met: [] Not Met: [] Adjusted  3. Patient will demonstrate an increase in Strength to within 5lbs of contralateral shoulder to allow for proper functional mobility as indicated by patients Functional Deficits. [x] Progressing: [] Met: [] Not Met: [] Adjusted  4. Patient will return to bathing/dressing/ADLs without increased symptoms or restriction. [x] Progressing: [] Met: [] Not Met: [] Adjusted  5. Pt will tolerate reaching OH without increased symptoms    [x] Progressing: [] Met: [] Not Met: [] Adjusted    Progression Towards Functional goals:  [x] Patient is progressing as expected towards functional goals listed. [] Progression is slowed due to complexities listed. [] Progression has been slowed due to co-morbidities. [] Plan just implemented, too soon to assess goals progression  [] Other:     ASSESSMENT:   Pt continues to be very stiff into ER/IR but overhead motion is improving since last visit. Shown additional stretches and was advised that at this stage he is OK to push into pain within reason.      Treatment/Activity Tolerance:  [x] Patient tolerated treatment well [] Patient limited by fatique  [x] Patient limited by pain/ guarding with manual therapy   Patient limited by other medical complications  [] Other:     Overall Progression Towards Functional goals/ Treatment Progress Update:  [] Patient is progressing as expected towards functional goals listed. [] Progression is slowed due to complexities/Impairments listed. [] Progression has been slowed due to co-morbidities. [x] Plan just implemented, too soon to assess goals progression <30days   [] Goals require adjustment due to lack of progress  [] Patient is not progressing as expected and requires additional follow up with physician  [] Other    Prognosis for POC: [x] Good [] Fair  [] Poor    Patient requires continued skilled intervention: [x] Yes  [] No      PLAN: Progress ROM and strength. [facility] shoulder spl[facility] at this stage. .   [x] Continue per plan of care [] Alter current plan (see comments)  [] Plan of care initiated [] Hold pending MD visit [] Discharge    Electronically signed by: Myriam Ann PT, DPT  Note: If patient does not return for scheduled/recommended follow up visits, this note will serve as a discharge from care along with the most recent update on progress.

## 2022-02-03 ENCOUNTER — APPOINTMENT (OUTPATIENT)
Dept: PHYSICAL THERAPY | Age: 67
End: 2022-02-03
Payer: MEDICARE

## 2022-02-04 ENCOUNTER — HOSPITAL ENCOUNTER (OUTPATIENT)
Dept: PHYSICAL THERAPY | Age: 67
Setting detail: THERAPIES SERIES
End: 2022-02-04
Payer: MEDICARE

## 2022-02-07 ENCOUNTER — HOSPITAL ENCOUNTER (OUTPATIENT)
Dept: PHYSICAL THERAPY | Age: 67
Setting detail: THERAPIES SERIES
Discharge: HOME | End: 2022-02-07
Payer: MEDICARE

## 2022-02-07 PROCEDURE — 97110 THERAPEUTIC EXERCISES: CPT

## 2022-02-07 PROCEDURE — 97140 MANUAL THERAPY 1/> REGIONS: CPT

## 2022-02-07 PROCEDURE — 97016 VASOPNEUMATIC DEVICE THERAPY: CPT

## 2022-02-07 NOTE — FLOWSHEET NOTE
[facility] [employer]    Physical Therapy Treatment Note/ Progress Report:     Date:  2022    Patient Name:  Selin Segura    :  1955  MRN: [id number]  Medical/Treatment Diagnosis Information:  · Diagnosis: s/p L shoulder rotator cuff repair 21  · Treatment Diagnosis: L shoulder pain C18.188  Insurance/Certification information:  PT Insurance Information: Medicare  Physician Information:  Referring Practitioner: Quinn Khan DO  Plan of care signed (Y/N): Pending    Date of Patient follow up with Physician: Delores Lemons 22     Progress Report: []  Yes  [x]  No     Functional Scale:   21 SPADI = 86% disability  22 SPADI = 86% disability    Date Range for reporting period:  Beginnin21  Endin22    Progress report due (10 Rx/or 30 days whichever is less):      Recertification due (POC duration/ or 90 days whichever is less): 3/21/22     Visit # Insurance Allowable Auth Needed   13  8 in    Medicare []Yes    []No     Pain level: 5/10     SUBJECTIVE:  Pt states he has been using his arm more at home with basic movements. Feels improvement with incorporating the hammock stretch. Still very tight with HBB movement. OBJECTIVE:     Observation:    Test measurements:      PROM (L) Shoulder:  75 deg with table slide  22 Left shoulder with palpable ST nodule with hematoma present over lateral arm deltoid into central arm. After STM this was able to move with decreased guarding with PROM. PROM flexion ~90 today w/ TENS unit and after STM   Left arm less hematoma present over biceps & central arm.  PROM ~ 100 flexion ~ 120 Abd  ER ~20 @ 30/ IR very stiff and guarded with empty end feel     ROM Left Right   Shoulder Flex 118* 149   Shoulder scaption 145 167   Shoulder ER 32* 60   Shoulder IR 55 65             22: Shoulder PROM - flexion 110 deg, abd 90 deg, ER 35 deg      RESTRICTIONS/PRECAUTIONS: large RCR, sAD, DCE -  DOS 12/14/21    Exercises/Interventions:   Therapeutic Ex  20 min Wt / Resistance Sets/sec Reps Notes   pulleys  5 min     Hammock stretch  2 min     Cane press + flexion 2# 3\" 10x 1/18   Wand ER seated  5\"  10 1/18 reviewed   Counter sliders  3\" 12x 1/18   scap clocks  1 20 12-6  3-9          Bent over codman row 5# 2 10    submax iso; flex and IR  10\" 10x reinstructed mechanics          HBB stretch At treadmill 10\" 10x                            Therapeutic Activities        [id number]  1/13   [employer]    1/18 updated                                                           Manual Intervention x 25'       Shoulder PROM  20 min     [facility] mobs, posterior and inferior  5 min Gr IV    Thoracic/Rib manipulation       CT MT/Mobs       PROM MT                     NMR re-education                                                                   Therapeutic Exercise and NMR EXR  [x] (05278) Provided verbal/tactile cueing for activities related to strengthening, flexibility, endurance, ROM  for improvements in scapular, scapulothoracic and UE control with self care, reaching, carrying, lifting, house/yardwork, driving/computer work.    [] (21421) Provided verbal/tactile cueing for activities related to improving balance, coordination, kinesthetic sense, posture, motor skill, proprioception  to assist with  scapular, scapulothoracic and UE control with self care, reaching, carrying, lifting, house/yardwork, driving/computer work. Therapeutic Activities:    [x] (38032 or 88986) Provided verbal/tactile cueing for activities related to improving balance, coordination, kinesthetic sense, posture, motor skill, proprioception and motor activation to allow for proper function of scapular, scapulothoracic and UE control with self care, carrying, lifting, driving/computer work.      Home Exercise Program:    [x] (99021) Reviewed/Progressed HEP activities related to Deficits. [x] Progressing: [] Met: [] Not Met: [] Adjusted     Long Term Goals: To be achieved in: 8-12 weeks  1. Disability index score of 20% or less for the DASH to assist with reaching prior level of function. [x] Progressing: [] Met: [] Not Met: [] Adjusted  2. Patient will demonstrate increased AROM to [facility] to allow for proper joint functioning as indicated by patients Functional Deficits. [x] Progressing: [] Met: [] Not Met: [] Adjusted  3. Patient will demonstrate an increase in Strength to within 5lbs of contralateral shoulder to allow for proper functional mobility as indicated by patients Functional Deficits. [x] Progressing: [] Met: [] Not Met: [] Adjusted  4. Patient will return to bathing/dressing/ADLs without increased symptoms or restriction. [x] Progressing: [] Met: [] Not Met: [] Adjusted  5. Pt will tolerate reaching OH without increased symptoms    [x] Progressing: [] Met: [] Not Met: [] Adjusted    Progression Towards Functional goals:  [x] Patient is progressing as expected towards functional goals listed. [] Progression is slowed due to complexities listed. [] Progression has been slowed due to co-morbidities. [] Plan just implemented, too soon to assess goals progression  [] Other:     ASSESSMENT:   Overhead PROM improving well. Still very stiff with posterior GH glides, especially in combined movements. Challenged with weighted cane press today. Treatment/Activity Tolerance:  [x] Patient tolerated treatment well [] Patient limited by fatique  [x] Patient limited by pain/ guarding with manual therapy   Patient limited by other medical complications  [] Other:     Overall Progression Towards Functional goals/ Treatment Progress Update:  [] Patient is progressing as expected towards functional goals listed. [] Progression is slowed due to complexities/Impairments listed. [] Progression has been slowed due to co-morbidities.   [x] Plan just implemented, too soon to assess goals progression <30days   [] Goals require adjustment due to lack of progress  [] Patient is not progressing as expected and requires additional follow up with physician  [] Other    Prognosis for POC: [x] Good [] Fair  [] Poor    Patient requires continued skilled intervention: [x] Yes  [] No      PLAN: Progress ROM and strength. Will contact [facility] regarding shoulder splint at this stage. .   [x] Continue per plan of care [] Alter current plan (see comments)  [] Plan of care initiated [] Hold pending MD visit [] Discharge    Electronically signed by: Jeff Mccrary, PT, DPT  Note: If patient does not return for scheduled/recommended follow up visits, this note will serve as a discharge from care along with the most recent update on progress.

## 2022-02-08 ENCOUNTER — HOSPITAL ENCOUNTER (OUTPATIENT)
Dept: PHYSICAL THERAPY | Age: 67
Setting detail: THERAPIES SERIES
Discharge: HOME | End: 2022-02-08
Payer: MEDICARE

## 2022-02-08 PROCEDURE — 97140 MANUAL THERAPY 1/> REGIONS: CPT | Performed by: SPECIALIST/TECHNOLOGIST

## 2022-02-08 PROCEDURE — 97110 THERAPEUTIC EXERCISES: CPT | Performed by: SPECIALIST/TECHNOLOGIST

## 2022-02-08 PROCEDURE — 97016 VASOPNEUMATIC DEVICE THERAPY: CPT | Performed by: SPECIALIST/TECHNOLOGIST

## 2022-02-08 NOTE — FLOWSHEET NOTE
[facility]    Physical Therapy Treatment Note/ Progress Report:     Date:  2022    Patient Name:  Booker Yanes    :  1955  MRN: [id number]  Medical/Treatment Diagnosis Information:  · Diagnosis: s/p L shoulder rotator cuff repair 21  · Treatment Diagnosis: L shoulder pain I33.838  Insurance/Certification information:  PT Insurance Information: Medicare  Physician Information:  Referring Practitioner: Claudy Simpson DO  Plan of care signed (Y/N): Pending    Date of Patient follow up with Physician: Kalyani Robles 22     Progress Report: []  Yes  [x]  No     Functional Scale:   21 SPADI = 86% disability  22 SPADI = 86% disability    Date Range for reporting period:  Beginnin21  Endin22    Progress report due (10 Rx/or 30 days whichever is less): 82     Recertification due (POC duration/ or 90 days whichever is less): 3/21/22     Visit # Insurance Allowable Auth Needed   13  9 in    Medicare []Yes    []No     Pain level: 5/10     SUBJECTIVE:  8 weeks s/p Pt feels as if he's making slow steady progress in ROM. Unable to take OTC NSAIDS due to GI irritation and was advised to d/c from PCP    OBJECTIVE:     Observation:    Test measurements:      PROM (L) Shoulder:  75 deg with table slide  22 Left shoulder with palpable ST nodule with hematoma present over lateral arm deltoid into central arm. After STM this was able to move with decreased guarding with PROM. PROM flexion ~90 today w/ TENS unit and after STM   Left arm less hematoma present over biceps & central arm.  PROM ~ 100 flexion ~ 120 Abd  ER ~20 @ 30/ IR very stiff and guarded with empty end feel     ROM Left Right   Shoulder Flex 118* 149   Shoulder scaption 145 167   Shoulder ER 32* 60   Shoulder IR 55 65             22: Shoulder PROM - flexion 110 deg, abd 90 deg, ER 35 deg      RESTRICTIONS/PRECAUTIONS: large RCR, sAD, DCE -  DOS 12/14/21    Exercises/Interventions: 25'  Therapeutic Ex     min Wt / Resistance Sets/sec Reps Notes   pulleys  5 min     Hammock stretch  2 min     Cane press + flexion 2# 3\" 10x  2/8   Wand ER  5\"  10 1/18 reviewed   Counter sliders  3\" 12x 1/18   scap clocks in SL   1 20 2/8   SL w/  A/A  A/A 2 10x 2/8   Bent over codman row 5# 2 10    submax iso; flex and IR  10\" 10x reinstructed mechanics          HBB stretch At treadmill 10\" 10x                            Therapeutic Activities        [id number]  1/13   [employer]    1/18 updated   [id number]   2/8    Updated                                                     Manual Intervention x 25'       Shoulder PROM  20 min     [address] mobs, posterior and inferior  5 min Gr IV    Thoracic/Rib manipulation       CT MT/Mobs       PROM MT                     NMR re-education                                                                   Therapeutic Exercise and NMR EXR  [x] (66761) Provided verbal/tactile cueing for activities related to strengthening, flexibility, endurance, ROM  for improvements in scapular, scapulothoracic and UE control with self care, reaching, carrying, lifting, house/yardwork, driving/computer work.    [] (09441) Provided verbal/tactile cueing for activities related to improving balance, coordination, kinesthetic sense, posture, motor skill, proprioception  to assist with  scapular, scapulothoracic and UE control with self care, reaching, carrying, lifting, house/yardwork, driving/computer work. Therapeutic Activities:    [x] (98897 or 02847) Provided verbal/tactile cueing for activities related to improving balance, coordination, kinesthetic sense, posture, motor skill, proprioception and motor activation to allow for proper function of scapular, scapulothoracic and UE control with self care, carrying, lifting, driving/computer work.      Home Exercise Program:    [x] (79051) Reviewed/Progressed HEP activities related to strengthening, flexibility, endurance, ROM of scapular, scapulothoracic and UE control with self care, reaching, carrying, lifting, house/yardwork, driving/computer work  [] (44908) Reviewed/Progressed HEP activities related to improving balance, coordination, kinesthetic sense, posture, motor skill, proprioception of scapular, scapulothoracic and UE control with self care, reaching, carrying, lifting, house/yardwork, driving/computer work      Manual Treatments:  PROM / STM / Oscillations-Mobs:  G-I, II, III, IV (PA's, Inf., Post.)  [x] (95187) Provided manual therapy to mobilize soft tissue/joints of cervical/CT, scapular GHJ and UE for the purpose of modulating pain, promoting relaxation,  increasing ROM, reducing/eliminating soft tissue swelling/inflammation/restriction, improving soft tissue extensibility and allowing for proper ROM for normal function with self care, reaching, carrying, lifting, house/yardwork, driving/computer work    Modalities:  Game ready for post op swelling x 15 min       Charges:  Timed Code Treatment Minutes: 45   Total Treatment Minutes: 60       [] EVAL (LOW) 41189 (typically 20 minutes face-to-face)  [] EVAL (MOD) 86454 (typically 30 minutes face-to-face)  [] EVAL (HIGH) 35346 (typically 45 minutes face-to-face)  [] RE-EVAL     [x] EA(12415) x   2  [] IONTO (59549)  [] NMR (43472) x     [x] VASO (96748)  [x] Manual (86979) x  1   [] Other:  [] TA (92842)x     [] Mech Traction (55680)  [] ES(attended) (45233)     [] ES (un) (18812):       GOALS:  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 Independent in HEP and progression per patient tolerance, in order to prevent re-injury. [] Progressing: [x] Met: [] Not Met: [] Adjusted  2. Patient will have a decrease in pain to facilitate improvement in movement, function, and ADLs as indicated by Functional Deficits.   [x] Progressing: [] Met: [] Not Met: [] Adjusted     Long Term Goals: To be achieved in: 8-12 weeks  1. Disability index score of 20% or less for the DASH to assist with reaching prior level of function. [x] Progressing: [] Met: [] Not Met: [] Adjusted  2. Patient will demonstrate increased AROM to [facility] to allow for proper joint functioning as indicated by patients Functional Deficits. [x] Progressing: [] Met: [] Not Met: [] Adjusted  3. Patient will demonstrate an increase in Strength to within 5lbs of contralateral shoulder to allow for proper functional mobility as indicated by patients Functional Deficits. [x] Progressing: [] Met: [] Not Met: [] Adjusted  4. Patient will return to bathing/dressing/ADLs without increased symptoms or restriction. [x] Progressing: [] Met: [] Not Met: [] Adjusted  5. Pt will tolerate reaching OH without increased symptoms    [x] Progressing: [] Met: [] Not Met: [] Adjusted    Progression Towards Functional goals:  [x] Patient is progressing as expected towards functional goals listed. [] Progression is slowed due to complexities listed. [] Progression has been slowed due to co-morbidities. [] Plan just implemented, too soon to assess goals progression  [] Other:     ASSESSMENT:   Overhead PROM improving well especially in ER and IR but still has a tight posterior capsule. Still very stiff with posterior GH glides, especially in combined movements. Pt has fair PROM in flexion and abduction with manual therapy. Challenged w/ stiffness end range with OH flexion/ presses and addition of SL ER A/A (in both directions) and TB in rows/ extensions. Pt denied pain except for having some end range discomfort with flexion primairly.    Treatment/Activity Tolerance:  [x] Patient tolerated treatment well [] Patient limited by fatique  [x] Patient limited by pain/ guarding with manual therapy   Patient limited by other medical complications  [] Other:     Overall Progression Towards

## 2022-02-10 ENCOUNTER — HOSPITAL ENCOUNTER (OUTPATIENT)
Dept: PHYSICAL THERAPY | Age: 67
Setting detail: THERAPIES SERIES
Discharge: HOME | End: 2022-02-10
Payer: MEDICARE

## 2022-02-10 PROCEDURE — 97110 THERAPEUTIC EXERCISES: CPT | Performed by: SPECIALIST/TECHNOLOGIST

## 2022-02-10 PROCEDURE — 97140 MANUAL THERAPY 1/> REGIONS: CPT | Performed by: SPECIALIST/TECHNOLOGIST

## 2022-02-10 PROCEDURE — 97016 VASOPNEUMATIC DEVICE THERAPY: CPT | Performed by: SPECIALIST/TECHNOLOGIST

## 2022-02-10 NOTE — FLOWSHEET NOTE
[facility] [employer]    Physical Therapy Treatment Note/ Progress Report:     Date:  2/10/2022    Patient Name:  Ruchi Champagne    :  1955  MRN: [id number]  Medical/Treatment Diagnosis Information:  · Diagnosis: s/p L shoulder rotator cuff repair 21  · Treatment Diagnosis: L shoulder pain Q52.144  Insurance/Certification information:  PT Insurance Information: Medicare  Physician Information:  Referring Practitioner: Ronnie Valdes DO  Plan of care signed (Y/N): Pending    Date of Patient follow up with Physician: Ramana Ramos 22     Progress Report: []  Yes  [x]  No     Functional Scale:   21 SPADI = 86% disability  22 SPADI = 86% disability    Date Range for reporting period:  Beginnin21  Endin22    Progress report due (10 Rx/or 30 days whichever is less): 74     Recertification due (POC duration/ or 90 days whichever is less): 3/21/22     Visit # Insurance Allowable Auth Needed   14  10 in    Medicare []Yes    []No     Pain level: 5/10     SUBJECTIVE:  8 weeks s/p Pt feels as if he's making slow steady progress in ROM. Unable to take OTC NSAIDS due to GI irritation and was advised to d/c from PCP    OBJECTIVE:     Observation:    Test measurements:      PROM (L) Shoulder:  75 deg with table slide  22 Left shoulder with palpable ST nodule with hematoma present over lateral arm deltoid into central arm. After STM this was able to move with decreased guarding with PROM. PROM flexion ~90 today w/ TENS unit and after STM   Left arm less hematoma present over biceps & central arm.  PROM ~ 100 flexion ~ 120 Abd  ER ~20 @ 30/ IR very stiff and guarded with empty end feel     ROM Left Right   Shoulder Flex 118* 149   Shoulder scaption 145 167   Shoulder ER 32* 60   Shoulder IR 55 65             22: Shoulder PROM - flexion 110 deg, abd 90 deg, ER 35 deg  2/10  Shoulder PROM left shoulder ~ 160, ABD ~ 165, ER 35 @ 90  IR 40  @ 90      RESTRICTIONS/PRECAUTIONS: large RCR, sAD, DCE -  DOS 12/14/21    Exercises/Interventions: 25'  Therapeutic Ex     min Wt / Resistance Sets/sec Reps Notes   pulleys  5 min     Hammock stretch  2 min     Cane press + flexion 2# 3\" 10x  2/8   Wand ER  5\"  10 1/18 reviewed   Counter sliders  3\" 12x 1/18   scap clocks in SL   1 20 2/8   SL w/  Less A/A   2 10x 2/8   Bent over codman row   prone row/ extensions 5#  2#/ 0# 2 10ea 2/10          HBB stretch At treadmill 10\" 10x    TB ROWS/ Ext     TB  ER step aways  BLUE/ Green  RED 2 10x   2/8    2/10   Serratus punches 0# 2 10x   2/10             Therapeutic Activities        [id number]  1/13   [employer]    1/18 updated   [id number]   2/8 & 2/10    Updated                                                     Manual Intervention x 25'       Shoulder PROM  20 min     GH mobs, posterior and inferior  5 min Gr IV    Thoracic/Rib manipulation       CT MT/Mobs       PROM MT                     NMR re-education                                                                   Therapeutic Exercise and NMR EXR  [x] (05206) Provided verbal/tactile cueing for activities related to strengthening, flexibility, endurance, ROM  for improvements in scapular, scapulothoracic and UE control with self care, reaching, carrying, lifting, house/yardwork, driving/computer work.    [] (10194) Provided verbal/tactile cueing for activities related to improving balance, coordination, kinesthetic sense, posture, motor skill, proprioception  to assist with  scapular, scapulothoracic and UE control with self care, reaching, carrying, lifting, house/yardwork, driving/computer work.     Therapeutic Activities:    [x] (84916 or 91221) Provided verbal/tactile cueing for activities related to improving balance, coordination, kinesthetic sense, posture, motor skill, proprioception and motor activation to allow for proper function of scapular, scapulothoracic and UE control with self care, carrying, lifting, driving/computer work. Home Exercise Program:    [x] (10765) Reviewed/Progressed HEP activities related to strengthening, flexibility, endurance, ROM of scapular, scapulothoracic and UE control with self care, reaching, carrying, lifting, house/yardwork, driving/computer work  [] (91584) Reviewed/Progressed HEP activities related to improving balance, coordination, kinesthetic sense, posture, motor skill, proprioception of scapular, scapulothoracic and UE control with self care, reaching, carrying, lifting, house/yardwork, driving/computer work      Manual Treatments:  PROM / STM / Oscillations-Mobs:  G-I, II, III, IV (PA's, Inf., Post.)  [x] (59279) Provided manual therapy to mobilize soft tissue/joints of cervical/CT, scapular GHJ and UE for the purpose of modulating pain, promoting relaxation,  increasing ROM, reducing/eliminating soft tissue swelling/inflammation/restriction, improving soft tissue extensibility and allowing for proper ROM for normal function with self care, reaching, carrying, lifting, house/yardwork, driving/computer work    Modalities:  Game ready for post op swelling x 15 min       Charges:  Timed Code Treatment Minutes: 45   Total Treatment Minutes: 60       [] EVAL (LOW) 41583 (typically 20 minutes face-to-face)  [] EVAL (MOD) 76653 (typically 30 minutes face-to-face)  [] EVAL (HIGH) 60180 (typically 45 minutes face-to-face)  [] RE-EVAL     [x] WA(64647) x   2  [] IONTO (66380)  [] NMR (85370) x     [x] VASO (79517)  [x] Manual (66361) x  1   [] Other:  [] TA (44372)x     [] Mech Traction (14423)  [] ES(attended) (83355)     [] ES (un) (87011):       GOALS:  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 Independent in HEP and progression per patient tolerance, in order to prevent re-injury.    [] Progressing: [x] Met: [] Not Met: [] Adjusted  2. Patient will have a decrease in pain to facilitate improvement in movement, function, and ADLs as indicated by Functional Deficits. [x] Progressing: [] Met: [] Not Met: [] Adjusted     Long Term Goals: To be achieved in: 8-12 weeks  1. Disability index score of 20% or less for the DASH to assist with reaching prior level of function. [x] Progressing: [] Met: [] Not Met: [] Adjusted  2. Patient will demonstrate increased AROM to [facility] to allow for proper joint functioning as indicated by patients Functional Deficits. [x] Progressing: [] Met: [] Not Met: [] Adjusted  3. Patient will demonstrate an increase in Strength to within 5lbs of contralateral shoulder to allow for proper functional mobility as indicated by patients Functional Deficits. [x] Progressing: [] Met: [] Not Met: [] Adjusted  4. Patient will return to bathing/dressing/ADLs without increased symptoms or restriction. [x] Progressing: [] Met: [] Not Met: [] Adjusted  5. Pt will tolerate reaching OH without increased symptoms    [x] Progressing: [] Met: [] Not Met: [] Adjusted    Progression Towards Functional goals:  [x] Patient is progressing as expected towards functional goals listed. [] Progression is slowed due to complexities listed. [] Progression has been slowed due to co-morbidities. [] Plan just implemented, too soon to assess goals progression  [] Other:     ASSESSMENT:   Overhead PROM improving well especially in ER and IR but still has a tight posterior capsule especially with ER. Still very stiff with posterior GH glides, especially in combined movements. Pt tolerated progressions with prone rows/ Extensions and progressions with Blue TB today. Pt tolerated with stepaways with red TB and not compensating with his upper trunk/ torso. Challenged w/ stiffness end range with OH flexion/ presses and addition of SL ER A/A (in both directions) and TB in rows/ extensions.  Pt denied pain except for having some end range discomfort with flexion primairly. Treatment/Activity Tolerance:  [x] Patient tolerated treatment well [] Patient limited by fatique  [x] Patient limited by pain/ guarding with manual therapy   Patient limited by other medical complications  [] Other:     Overall Progression Towards Functional goals/ Treatment Progress Update:  [] Patient is progressing as expected towards functional goals listed. [] Progression is slowed due to complexities/Impairments listed. [] Progression has been slowed due to co-morbidities. [x] Plan just implemented, too soon to assess goals progression <30days   [] Goals require adjustment due to lack of progress  [] Patient is not progressing as expected and requires additional follow up with physician  [] Other    Prognosis for POC: [x] Good [] Fair  [] Poor    Patient requires continued skilled intervention: [x] Yes  [] No      PLAN: Progress ROM and strength. Will contact dynasplint regarding shoulder splint at this stage. .   [x] Continue per plan of care [] Alter current plan (see comments)  [] Plan of care initiated [] Hold pending MD visit [] Discharge    Electronically signed by: Derrick Ryder, [place], [id number]  Note: If patient does not return for scheduled/recommended follow up visits, this note will serve as a discharge from care along with the most recent update on progress.

## 2022-02-15 ENCOUNTER — HOSPITAL ENCOUNTER (OUTPATIENT)
Dept: PHYSICAL THERAPY | Age: 67
Setting detail: THERAPIES SERIES
Discharge: HOME | End: 2022-02-15
Payer: MEDICARE

## 2022-02-15 PROCEDURE — 97110 THERAPEUTIC EXERCISES: CPT | Performed by: SPECIALIST/TECHNOLOGIST

## 2022-02-15 PROCEDURE — 97140 MANUAL THERAPY 1/> REGIONS: CPT | Performed by: SPECIALIST/TECHNOLOGIST

## 2022-02-15 PROCEDURE — 97016 VASOPNEUMATIC DEVICE THERAPY: CPT | Performed by: SPECIALIST/TECHNOLOGIST

## 2022-02-15 NOTE — FLOWSHEET NOTE
[facility] [employer]    Physical Therapy Treatment Note/ Progress Report:     Date:  2/15/2022    Patient Name:  Ion Mcdermott    :  1955  MRN: [id number]  Medical/Treatment Diagnosis Information:  · Diagnosis: s/p L shoulder rotator cuff repair 21  · Treatment Diagnosis: L shoulder pain I60.103  Insurance/Certification information:  PT Insurance Information: Medicare  Physician Information:  Referring Practitioner: Mateusz Busby DO  Plan of care signed (Y/N): Pending    Date of Patient follow up with Physician: Flor Tsai 22     Progress Report: []  Yes  [x]  No     Functional Scale:   21 SPADI = 86% disability  22 SPADI = 86% disability    Date Range for reporting period:  Beginnin21  Endin22    Progress report due (10 Rx/or 30 days whichever is less): 00     Recertification due (POC duration/ or 90 days whichever is less): 3/21/22     Visit # Insurance Allowable Auth Needed   14  11 in    Medicare []Yes    []No     Pain level:   1-2/ 8                        Worst with AROM OP IR/ ER 5/10     SUBJECTIVE:  9 weeks s/p Pt reports that he feels like hes making steady gains/ progress. Feels like his IR is making progress. HEP TB and cane ER stretching & holding Hammock about 2-3 minutes. OBJECTIVE:     Observation:    Test measurements:      PROM (L) Shoulder:  75 deg with table slide  22 Left shoulder with palpable ST nodule with hematoma present over lateral arm deltoid into central arm. After STM this was able to move with decreased guarding with PROM. PROM flexion ~90 today w/ TENS unit and after STM   Left arm less hematoma present over biceps & central arm.  PROM ~ 100 flexion ~ 120 Abd  ER ~20 @ 30/ IR very stiff and guarded with empty end feel     ROM Left Right   Shoulder Flex 118* 149   Shoulder scaption 145 167   Shoulder ER 32* 60   Shoulder IR 55 65             22: Shoulder PROM - flexion 110 deg, abd 90 deg, ER 35 deg  2/10  Shoulder PROM left shoulder ~ 160, ABD ~ 165, ER 35 @ 90  IR 40  @ 90      RESTRICTIONS/PRECAUTIONS: large RCR, sAD, DCE -  DOS 12/14/21    Exercises/Interventions: 25'  Therapeutic Ex     min Wt / Resistance Sets/sec Reps Notes   pulleys  5 min     Hammock stretch  2 min     Cane press  4# 2 10x  2/8    Door ER 15\"5x2/15Counter sliders  3\" 12x 1/18   SL ER to neutral 7 ounces 2 10x 2/15   IR in supine with left hand BB 1'   2/15      prone Row/ Extensions   4#/ 2# 2 10ea 2/15   Finger walks up ladder 3'      HBB stretch At treadmill 15\" 5x 2/15   TB ROWS/ Ext     TB  ER step aways  BLUE/ Green  RED 2 10x   2/8    2/10   Serratus punches   2# 2 10x   2/15  Wt added             Therapeutic Activities        [id number]  1/13   [employer]    1/18 updated   [id number]   2/8 & 2/10    Updated                                                     Manual Intervention x 25'       Shoulder PROM  20 min     [address] mobs, posterior and inferior  5 min Gr IV    Thoracic/Rib manipulation       CT MT/Mobs       PROM MT                     NMR re-education       Supine flex to sh height 0# 1 15x 2/15                                                        Therapeutic Exercise and NMR EXR  [x] (88124) Provided verbal/tactile cueing for activities related to strengthening, flexibility, endurance, ROM  for improvements in scapular, scapulothoracic and UE control with self care, reaching, carrying, lifting, house/yardwork, driving/computer work.    [] (95079) Provided verbal/tactile cueing for activities related to improving balance, coordination, kinesthetic sense, posture, motor skill, proprioception  to assist with  scapular, scapulothoracic and UE control with self care, reaching, carrying, lifting, house/yardwork, driving/computer work.     Therapeutic Activities:    [x] (41634 or 65159) Provided verbal/tactile cueing for activities related to improving balance, coordination, kinesthetic in: 2 weeks  1. Independent in HEP and progression per patient tolerance, in order to prevent re-injury. [] Progressing: [x] Met: [] Not Met: [] Adjusted  2. Patient will have a decrease in pain to facilitate improvement in movement, function, and ADLs as indicated by Functional Deficits. [x] Progressing: [] Met: [] Not Met: [] Adjusted     Long Term Goals: To be achieved in: 8-12 weeks  1. Disability index score of 20% or less for the DASH to assist with reaching prior level of function. [x] Progressing: [] Met: [] Not Met: [] Adjusted  2. Patient will demonstrate increased AROM to [facility] to allow for proper joint functioning as indicated by patients Functional Deficits. [x] Progressing: [] Met: [] Not Met: [] Adjusted  3. Patient will demonstrate an increase in Strength to within 5lbs of contralateral shoulder to allow for proper functional mobility as indicated by patients Functional Deficits. [x] Progressing: [] Met: [] Not Met: [] Adjusted  4. Patient will return to bathing/dressing/ADLs without increased symptoms or restriction. [x] Progressing: [] Met: [] Not Met: [] Adjusted  5. Pt will tolerate reaching OH without increased symptoms    [x] Progressing: [] Met: [] Not Met: [] Adjusted    Progression Towards Functional goals:  [x] Patient is progressing as expected towards functional goals listed. [] Progression is slowed due to complexities listed. [] Progression has been slowed due to co-morbidities. [] Plan just implemented, too soon to assess goals progression  [] Other:     ASSESSMENT:   Overhead PROM improving well especially in ER and IR but still has a tight posterior capsule especially with joint  mobilizations. Pt tolerated with stepaways with red TB and not compensating with his upper trunk/ torso.   Challenged with addition of supine flexion raises to 90, wt with SL ER, and addition of wts throughout the program.   Pt denied pain except for having some end range discomfort with flexion primairly. Treatment/Activity Tolerance:  [x] Patient tolerated treatment well [x] Patient limited by fatique  [] Patient limited by pain/ guarding with manual therapy   Patient limited by other medical complications  [] Other:     Overall Progression Towards Functional goals/ Treatment Progress Update:  [] Patient is progressing as expected towards functional goals listed. [] Progression is slowed due to complexities/Impairments listed. [] Progression has been slowed due to co-morbidities. [x] Plan just implemented, too soon to assess goals progression <30days   [] Goals require adjustment due to lack of progress  [] Patient is not progressing as expected and requires additional follow up with physician  [] Other    Prognosis for POC: [x] Good [] Fair  [] Poor    Patient requires continued skilled intervention: [x] Yes  [] No      PLAN: Progress ROM and strength. Will contact dynasplint regarding shoulder splint at this stage. .   [x] Continue per plan of care [] Alter current plan (see comments)  [] Plan of care initiated [] Hold pending MD visit [] Discharge    Electronically signed by: Niesha Cruz, [facility], [id number]  Note: If patient does not return for scheduled/recommended follow up visits, this note will serve as a discharge from care along with the most recent update on progress.

## 2022-02-17 ENCOUNTER — HOSPITAL ENCOUNTER (OUTPATIENT)
Dept: PHYSICAL THERAPY | Age: 67
Setting detail: THERAPIES SERIES
Discharge: HOME | End: 2022-02-17
Payer: MEDICARE

## 2022-02-17 PROCEDURE — 97140 MANUAL THERAPY 1/> REGIONS: CPT

## 2022-02-17 PROCEDURE — 97110 THERAPEUTIC EXERCISES: CPT

## 2022-02-17 NOTE — FLOWSHEET NOTE
[facility]    Physical Therapy Treatment Note/ Progress Report:     Date:  2022    Patient Name:  Makenna Hollis    :  1955  MRN: [id number]  Medical/Treatment Diagnosis Information:  · Diagnosis: s/p L shoulder rotator cuff repair 21  · Treatment Diagnosis: L shoulder pain P14.756  Insurance/Certification information:  PT Insurance Information: Medicare  Physician Information:  Referring Practitioner: Shannon Zuniga DO  Plan of care signed (Y/N): Pending    Date of Patient follow up with Physician: Kiley Mcclure 22     Progress Report: []  Yes  [x]  No     Functional Scale:   21 SPADI = 86% disability  22 SPADI = 86% disability    Date Range for reporting period:  Beginnin21  Endin22    Progress report due (10 Rx/or 30 days whichever is less):      Recertification due (POC duration/ or 90 days whichever is less): 3/21/22     Visit # Insurance Allowable Auth Needed   15  12 in    Medicare []Yes    []No     Pain level:   1-2/ 10                        Worst with AROM OP IR/ ER /10     SUBJECTIVE:   Pt notes that his shoulder is feeling better. 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 Still feels very weak with reaching OH or away from his body at all. OBJECTIVE:     Observation:    Test measurements:      PROM (L) Shoulder:  75 deg with table slide  22 Left shoulder with palpable ST nodule with hematoma present over lateral arm deltoid into central arm. After STM this was able to move with decreased guarding with PROM. PROM flexion ~90 today w/ TENS unit and after STM   Left arm less hematoma present over biceps & central arm.  PROM ~ 100 flexion ~ 120 Abd  ER ~20 @ 30/ IR very stiff and guarded with empty end feel     ROM Left Right   Shoulder Flex 118* 149   Shoulder scaption 145 167   Shoulder ER 32* 60   Shoulder IR 55 65             22: Shoulder PROM - flexion 110 deg, abd 90 deg, ER 35 deg  2/10  Shoulder PROM left shoulder ~ 160, ABD ~ 165, ER 35 @ 90  IR 40  @ 90      RESTRICTIONS/PRECAUTIONS: large RCR, sAD, DCE -  DOS 12/14/21    Exercises/Interventions:  Therapeutic Ex     min Wt / Resistance Sets/sec Reps Notes   pulleys  5 min     Hammock stretch  2 min     Incline Cane press 4# 3 10x      Door ER 15\"5x2/15Counter sliders  3\" 12x 1/18   SL ER to neutral 1# 2 10x 2/15   SL abduction       IR in supine with left hand BB 1'   2/15    prone Row/ Extensions 4#/ 2# 2 10ea ^ row weight npv   Prone T  3 5 Eccentric wall slides  1 5 Discussed for HEP   Finger walks up ladder 3'      HBB stretch At treadmill 15\" 5x 2/15   TB ROWS/ Ext     TB  ER step aways  BLUE/ Green  RED 2 10x   2/8    2/10   Serratus punches   2# 2 10x   2/15  Wt added             Therapeutic Activities        [id number]  1/13   [employer]    1/18 updated   [id number]   2/8 & 2/10    Updated                                                     Manual Intervention x 15'       Shoulder PROM  10 min     [address] mobs, posterior and inferior  5 min Gr IV    Thoracic/Rib manipulation       CT MT/Mobs       PROM MT                     NMR re-education       Supine flex to sh height 0# 1 15x 2/15                                                        Therapeutic Exercise and NMR EXR  [x] (53806) Provided verbal/tactile cueing for activities related to strengthening, flexibility, endurance, ROM  for improvements in scapular, scapulothoracic and UE control with self care, reaching, carrying, lifting, house/yardwork, driving/computer work.    [] (66528) Provided verbal/tactile cueing for activities related to improving balance, coordination, kinesthetic sense, posture, motor skill, proprioception  to assist with  scapular, scapulothoracic and UE control with self care, reaching, carrying, lifting, house/yardwork, driving/computer work.     Therapeutic Activities:    [x] (70497 or 22332) Provided verbal/tactile cueing for activities related to improving balance, coordination, kinesthetic sense, posture, motor skill, proprioception and motor activation to allow for proper function of scapular, scapulothoracic and UE control with self care, carrying, lifting, driving/computer work.      Home Exercise Program:    [x] (40255) Reviewed/Progressed HEP activities related to strengthening, flexibility, endurance, ROM of scapular, scapulothoracic and UE control with self care, reaching, carrying, lifting, house/yardwork, driving/computer work  [] (35117) Reviewed/Progressed HEP activities related to improving balance, coordination, kinesthetic sense, posture, motor skill, proprioception of scapular, scapulothoracic and UE control with self care, reaching, carrying, lifting, house/yardwork, driving/computer work      Manual Treatments:  PROM / STM / Oscillations-Mobs:  G-I, II, III, IV (PA's, Inf., Post.)  [x] (38768) Provided manual therapy to mobilize soft tissue/joints of cervical/CT, scapular GHJ and UE for the purpose of modulating pain, promoting relaxation,  increasing ROM, reducing/eliminating soft tissue swelling/inflammation/restriction, improving soft tissue extensibility and allowing for proper ROM for normal function with self care, reaching, carrying, lifting, house/yardwork, driving/computer work    Modalities:  Game ready for post op swelling x 15 min       Charges:  Timed Code Treatment Minutes: 45   Total Treatment Minutes: 60       [] EVAL (LOW) 51787 (typically 20 minutes face-to-face)  [] EVAL (MOD) 74903 (typically 30 minutes face-to-face)  [] EVAL (HIGH) 53124 (typically 45 minutes face-to-face)  [] RE-EVAL     [x] HU(64804) x   2  [] IONTO (99620)  [] NMR (65294) x     [x] VASO (27539)  [x] Manual (89290) x  1   [] Other:  [] TA (89333)x     [] Mech Traction (24235)  [] ES(attended) (80833)     [] ES (un) (87202):       GOALS:  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 Independent in HEP and progression per patient tolerance, in order to prevent re-injury. [] Progressing: [x] Met: [] Not Met: [] Adjusted  2. Patient will have a decrease in pain to facilitate improvement in movement, function, and ADLs as indicated by Functional Deficits. [x] Progressing: [] Met: [] Not Met: [] Adjusted     Long Term Goals: To be achieved in: 8-12 weeks  1. Disability index score of 20% or less for the DASH to assist with reaching prior level of function. [x] Progressing: [] Met: [] Not Met: [] Adjusted  2. Patient will demonstrate increased AROM to [facility] to allow for proper joint functioning as indicated by patients Functional Deficits. [x] Progressing: [] Met: [] Not Met: [] Adjusted  3. Patient will demonstrate an increase in Strength to within 5lbs of contralateral shoulder to allow for proper functional mobility as indicated by patients Functional Deficits. [x] Progressing: [] Met: [] Not Met: [] Adjusted  4. Patient will return to bathing/dressing/ADLs without increased symptoms or restriction. [x] Progressing: [] Met: [] Not Met: [] Adjusted  5. Pt will tolerate reaching OH without increased symptoms    [x] Progressing: [] Met: [] Not Met: [] Adjusted    Progression Towards Functional goals:  [x] Patient is progressing as expected towards functional goals listed. [] Progression is slowed due to complexities listed. [] Progression has been slowed due to co-morbidities. [] Plan just implemented, too soon to assess goals progression  [] Other:     ASSESSMENT:  Shoulder PROM is progressing well in OH positions. ER to 55 deg, IR to 45 deg today. Pt was fitted for ER splint today through [employer] rep with good tolerance. Discussed eccentric strength for flexion to be performed in HEP.        Treatment/Activity Tolerance:  [x] Patient tolerated treatment well [x] Patient limited by vi  [] Patient limited by pain/ guarding with manual therapy   Patient limited by other medical complications  [] Other:     Overall Progression Towards Functional goals/ Treatment Progress Update:  [] Patient is progressing as expected towards functional goals listed. [] Progression is slowed due to complexities/Impairments listed. [] Progression has been slowed due to co-morbidities. [x] Plan just implemented, too soon to assess goals progression <30days   [] Goals require adjustment due to lack of progress  [] Patient is not progressing as expected and requires additional follow up with physician  [] Other    Prognosis for POC: [x] Good [] Fair  [] Poor    Patient requires continued skilled intervention: [x] Yes  [] No      PLAN: Progress ROM and strength. Will contact dynasplint regarding shoulder splint at this stage. .   [x] Continue per plan of care [] Alter current plan (see comments)  [] Plan of care initiated [] Hold pending MD visit [] Discharge    Electronically signed by: Sury Weaver, PT, DPT  Note: If patient does not return for scheduled/recommended follow up visits, this note will serve as a discharge from care along with the most recent update on progress.

## 2022-02-18 ENCOUNTER — HOSPITAL ENCOUNTER (OUTPATIENT)
Dept: PHYSICAL THERAPY | Age: 67
Setting detail: THERAPIES SERIES
Discharge: HOME | End: 2022-02-18
Payer: MEDICARE

## 2022-02-18 PROCEDURE — 97140 MANUAL THERAPY 1/> REGIONS: CPT

## 2022-02-18 PROCEDURE — 97016 VASOPNEUMATIC DEVICE THERAPY: CPT

## 2022-02-18 PROCEDURE — 97110 THERAPEUTIC EXERCISES: CPT

## 2022-02-18 NOTE — PROGRESS NOTES
[facility]    Physical Therapy Treatment Note/ Progress Report:     Date:  2022    Patient Name:  Ty Mathis    :  1955  MRN: [id number]  Medical/Treatment Diagnosis Information:  · Diagnosis: s/p L shoulder rotator cuff repair 21  · Treatment Diagnosis: L shoulder pain E03.012  Insurance/Certification information:  PT Insurance Information: Medicare  Physician Information:  Referring Practitioner: Ernesto Purvis DO  Plan of care signed (Y/N): Pending    Date of Patient follow up with Physician: Jewel Aceves 22     Progress Report: [x]  Yes  []  No     Functional Scale:   21 SPADI = 86% disability  22 SPADI = 86% disability  22 SPADI = 33% disability    Date Range for reporting period:  Beginnin21  Endin22    Progress report due (10 Rx/or 30 days whichever is IACZ):     Recertification due (POC duration/ or 90 days whichever is less): 3/21/22     Visit # Insurance Allowable Auth Needed   16  13 in    Medicare []Yes    []No     Pain level:   0/ 10 At rest    SUBJECTIVE:   Pt states that his shoulder feels good at rest, just has pain with stretching. Has been using the ER splint 2x/day which he enjoys using. OBJECTIVE:     Observation:    Test measurements:      PROM (L) Shoulder:  75 deg with table slide  22 Left shoulder with palpable ST nodule with hematoma present over lateral arm deltoid into central arm. After STM this was able to move with decreased guarding with PROM. PROM flexion ~90 today w/ TENS unit and after STM   Left arm less hematoma present over biceps & central arm.  PROM ~ 100 flexion ~ 120 Abd  ER ~20 @ 30/ IR very stiff and guarded with empty end feel     ROM Left Right   Shoulder Flex 160 149   Shoulder scaption 160 167   Shoulder ER 40 60   Shoulder IR 30 65               RESTRICTIONS/PRECAUTIONS: large RCR, sAD, DCE -  DOS 12/14/21    Exercises/Interventions:  Therapeutic Ex     min Wt / Resistance Sets/sec Reps Notes   pulleys  5 min     Hammock stretch  2 min     Incline Cane press 3# 3 10x     Counter sliders  3\" 12x 1/18   SL ER to neutral 1# 2 10x 2/15   SL abduction       IR in supine with left hand BB 1'   2/15    prone Row/ Extensions 4#/ 2# 2 10ea ^ row weight npv   Prone T  3 5 Eccentric wall slides  1 5 Discussed for HEP   Finger walks up ladder 3'      HBB stretch At treadmill 15\" 5x 2/15   TB ROWS/ Ext     TB  ER step aways  BLUE/ Green  RED 2 10x   2/8    2/10   Serratus punches   2# 2 10x   2/15  Wt added                    Therapeutic Activities        [id number] - comprehensive and updated per pt request 5 min                                                              Manual Intervention x 15'       Shoulder PROM  10 min     [facility] mobs, posterior and inferior  5 min Gr IV    Thoracic/Rib manipulation                                   NMR re-education       Supine flex to sh height 0# 1 15x 2/15                                                        Therapeutic Exercise and NMR EXR  [x] (90957) Provided verbal/tactile cueing for activities related to strengthening, flexibility, endurance, ROM  for improvements in scapular, scapulothoracic and UE control with self care, reaching, carrying, lifting, house/yardwork, driving/computer work.    [] (44483) Provided verbal/tactile cueing for activities related to improving balance, coordination, kinesthetic sense, posture, motor skill, proprioception  to assist with  scapular, scapulothoracic and UE control with self care, reaching, carrying, lifting, house/yardwork, driving/computer work.     Therapeutic Activities:    [x] (05093 or 61613) Provided verbal/tactile cueing for activities related to improving balance, coordination, kinesthetic sense, posture, motor skill, proprioception and motor activation to allow for proper function of scapular, scapulothoracic and UE control with self care, carrying, lifting, driving/computer work. Home Exercise Program:    [x] (12855) Reviewed/Progressed HEP activities related to strengthening, flexibility, endurance, ROM of scapular, scapulothoracic and UE control with self care, reaching, carrying, lifting, house/yardwork, driving/computer work  [] (50982) Reviewed/Progressed HEP activities related to improving balance, coordination, kinesthetic sense, posture, motor skill, proprioception of scapular, scapulothoracic and UE control with self care, reaching, carrying, lifting, house/yardwork, driving/computer work      Manual Treatments:  PROM / STM / Oscillations-Mobs:  G-I, II, III, IV (PA's, Inf., Post.)  [x] (97607) Provided manual therapy to mobilize soft tissue/joints of cervical/CT, scapular GHJ and UE for the purpose of modulating pain, promoting relaxation,  increasing ROM, reducing/eliminating soft tissue swelling/inflammation/restriction, improving soft tissue extensibility and allowing for proper ROM for normal function with self care, reaching, carrying, lifting, house/yardwork, driving/computer work    Modalities:  Game ready for post op swelling x 15 min       Charges:  Timed Code Treatment Minutes: 45   Total Treatment Minutes: 60       [] EVAL (LOW) 55063 (typically 20 minutes face-to-face)  [] EVAL (MOD) 49035 (typically 30 minutes face-to-face)  [] EVAL (HIGH) 85996 (typically 45 minutes face-to-face)  [] RE-EVAL     [x] FE(34513) x   2  [] IONTO (14699)  [] NMR (16487) x     [x] VASO (48019)  [x] Manual (28185) x  1   [] Other:  [] TA (24454)x     [] Mech Traction (26521)  [] ES(attended) (62600)     [] ES (un) (30217):       GOALS:  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 Independent in HEP and progression per patient tolerance, in order to prevent re-injury.    [] Progressing: [x] Met: [] Not Met: [] Adjusted  2. Patient will have a decrease in pain to facilitate improvement in movement, function, and ADLs as indicated by Functional Deficits. [x] Progressing: [] Met: [] Not Met: [] Adjusted     Long Term Goals: To be achieved in: 8-12 weeks  1. Disability index score of 20% or less for the DASH to assist with reaching prior level of function. [x] Progressing: [] Met: [] Not Met: [] Adjusted  2. Patient will demonstrate increased AROM to [facility] to allow for proper joint functioning as indicated by patients Functional Deficits. [x] Progressing: [] Met: [] Not Met: [] Adjusted  3. Patient will demonstrate an increase in Strength to within 5lbs of contralateral shoulder to allow for proper functional mobility as indicated by patients Functional Deficits. [x] Progressing: [] Met: [] Not Met: [] Adjusted  4. Patient will return to bathing/dressing/ADLs without increased symptoms or restriction. [x] Progressing: [] Met: [] Not Met: [] Adjusted  5. Pt will tolerate reaching OH without increased symptoms    [x] Progressing: [] Met: [] Not Met: [] Adjusted    Progression Towards Functional goals:  [x] Patient is progressing as expected towards functional goals listed. [] Progression is slowed due to complexities listed. [] Progression has been slowed due to co-morbidities. [] Plan just implemented, too soon to assess goals progression  [] Other:     ASSESSMENT: Pt is progressing well toward all of his functional goals. PROM into flexion and abduction are WFL, but he continues to be limited with ER/IR which should hopefully improve with use of dynamic splint. AROM strength is progressing slowly. He continues to demonstrate some guarding of the left shoulder with significant anterior roll of the left shoulder affecting scapular positioning. He will continue to benefit from PT in order to increase shoulder ROM, strength, and stability to reach functional goals.       Treatment/Activity Tolerance:  [x] Patient tolerated treatment well [x] Patient limited by fatique  [] Patient limited by pain/ guarding with manual therapy   Patient limited by other medical complications  [] Other:     Overall Progression Towards Functional goals/ Treatment Progress Update:  [] Patient is progressing as expected towards functional goals listed. [x] Progression is slowed due to complexities/Impairments listed. [] Progression has been slowed due to co-morbidities. [] Plan just implemented, too soon to assess goals progression <30days   [] Goals require adjustment due to lack of progress  [] Patient is not progressing as expected and requires additional follow up with physician  [] Other    Prognosis for POC: [x] Good [] Fair  [] Poor    Patient requires continued skilled intervention: [x] Yes  [] No      PLAN: Progress ROM and strength. Will contact dynasplint regarding shoulder splint at this stage. .   [x] Continue per plan of care [] Alter current plan (see comments)  [] Plan of care initiated [] Hold pending MD visit [] Discharge    Electronically signed by: Shasha Hernandez, PT, DPT  Note: If patient does not return for scheduled/recommended follow up visits, this note will serve as a discharge from care along with the most recent update on progress.

## 2022-02-22 ENCOUNTER — HOSPITAL ENCOUNTER (OUTPATIENT)
Dept: PHYSICAL THERAPY | Age: 67
Setting detail: THERAPIES SERIES
Discharge: HOME | End: 2022-02-22
Payer: MEDICARE

## 2022-02-22 PROCEDURE — 97140 MANUAL THERAPY 1/> REGIONS: CPT | Performed by: SPECIALIST/TECHNOLOGIST

## 2022-02-22 PROCEDURE — 97110 THERAPEUTIC EXERCISES: CPT | Performed by: SPECIALIST/TECHNOLOGIST

## 2022-02-22 PROCEDURE — 97016 VASOPNEUMATIC DEVICE THERAPY: CPT | Performed by: SPECIALIST/TECHNOLOGIST

## 2022-02-22 NOTE — FLOWSHEET NOTE
[employer]    Physical Therapy Treatment Note/ Progress Report:     Date:  2022    Patient Name:  Nnamdi Medina    :  1955  MRN: [id number]  Medical/Treatment Diagnosis Information:  · Diagnosis: s/p L shoulder rotator cuff repair 21  · Treatment Diagnosis: L shoulder pain F95.873  Insurance/Certification information:  PT Insurance Information: Medicare  Physician Information:  Referring Practitioner: Baron Lakisha DO  Plan of care signed (Y/N): Pending    Date of Patient follow up with Physician: Kalani Ken 22     Progress Report: []  Yes  [x]  No     Functional Scale:   21 SPADI = 86% disability  22 SPADI = 86% disability    Date Range for reporting period:  Beginnin21  Endin22    Progress report due (10 Rx/or 30 days whichever is less):     Recertification due (POC duration/ or 90 days whichever is less): 3/21/22     Visit # Insurance Allowable Auth Needed   15  13 in    Medicare []Yes    []No     Pain level:   1-2/ 8                        Worst with AROM OP IR/ ER 5/10     SUBJECTIVE:   Pt has had some anterior shoulder discomfort with progressions of left shoulder Er with using the dynasplint, doorway, hammock stretch. Pt is using dynasplint for about 5 minutes LLLD but doesn't feel like his ROM is as good as manual stretching. He sees Nicholas tomorrow but admits he attempted to lift a gallon of milk from the fridge. OBJECTIVE:     Observation:    Test measurements:      PROM (L) Shoulder:  75 deg with table slide  22 Left shoulder with palpable ST nodule with hematoma present over lateral arm deltoid into central arm. After STM this was able to move with decreased guarding with PROM. PROM flexion ~90 today w/ TENS unit and after STM   Left arm less hematoma present over biceps & central arm.  PROM ~ 100 flexion ~ 120 Abd  ER ~20 @ 30/ IR very stiff and guarded with empty end feel  1/18/ 22   ROM Left Right   Shoulder Flex 118* 149   Shoulder scaption 145 167   Shoulder ER 32* 60   Shoulder IR 55 65             2/1/22: Shoulder PROM - flexion 110 deg, abd 90 deg, ER 35 deg  2/10  Shoulder PROM left shoulder ~ 160, ABD ~ 165, ER 35 @ 90  IR 40  @ 90      RESTRICTIONS/PRECAUTIONS: large RCR, sAD, DCE -  DOS 12/14/21    Exercises/Interventions:  45'  Therapeutic Ex     min Wt / Resistance Sets/sec Reps Notes   pulleys  5 min     Hammock stretch  2 min     Incline Cane press 4# 3 10x      Door ER 15\"5x2/15Counter sliders  3\" 12x 1/18   SL ER to neutral 1# 3 10x 2/15   SL abduction       IR in supine with left hand BB 1'   2/15    Prone Row/ Extensions 5#/ 2# 2 10ea ^ row weight npv   Prone T  3 5 Eccentric wall slides  1 5 Discussed for HEP   Finger walks up ladder 3'      HBB stretch At treadmill 15\" 5x 2/15   HEPSerratus punches   3# 2 10x   2/22  Wt added             Therapeutic Activities        [id number]  1/13   [employer]     updated   [id number]   2/8 & 2/10    Updated    [id number]   2/ 22    updated   Pt updated to protect his left shoulder with lifting milk out of his fridge                                          Manual Intervention x 15'       Shoulder PROM  10 min     GH mobs, posterior and inferior  5 min Gr IV    Thoracic/Rib manipulation       CT MT/Mobs       PROM MT                     NMR re-education  10'       Supine flex  to about 90 sh height 0# 1 15x 2/22   BB ER/ IR yellow 10\" 5x 2/22                                                 Therapeutic Exercise and NMR EXR  [x] (68394) Provided verbal/tactile cueing for activities related to strengthening, flexibility, endurance, ROM  for improvements in scapular, scapulothoracic and UE control with self care, reaching, carrying, lifting, house/yardwork, driving/computer work.    [] (91879) Provided verbal/tactile cueing for activities related to improving balance, coordination, kinesthetic sense, posture, motor skill, proprioception  to assist with  scapular, scapulothoracic and UE control with self care, reaching, carrying, lifting, house/yardwork, driving/computer work. Therapeutic Activities:    [x] (21132 or 41342) Provided verbal/tactile cueing for activities related to improving balance, coordination, kinesthetic sense, posture, motor skill, proprioception and motor activation to allow for proper function of scapular, scapulothoracic and UE control with self care, carrying, lifting, driving/computer work.      Home Exercise Program:    [x] (95645) Reviewed/Progressed HEP activities related to strengthening, flexibility, endurance, ROM of scapular, scapulothoracic and UE control with self care, reaching, carrying, lifting, house/yardwork, driving/computer work  [] (75302) Reviewed/Progressed HEP activities related to improving balance, coordination, kinesthetic sense, posture, motor skill, proprioception of scapular, scapulothoracic and UE control with self care, reaching, carrying, lifting, house/yardwork, driving/computer work      Manual Treatments:  PROM / STM / Oscillations-Mobs:  G-I, II, III, IV (PA's, Inf., Post.)  [x] (30917) Provided manual therapy to mobilize soft tissue/joints of cervical/CT, scapular GHJ and UE for the purpose of modulating pain, promoting relaxation,  increasing ROM, reducing/eliminating soft tissue swelling/inflammation/restriction, improving soft tissue extensibility and allowing for proper ROM for normal function with self care, reaching, carrying, lifting, house/yardwork, driving/computer work    Modalities:  Game ready for post op swelling x 15 min       Charges:  Timed Code Treatment Minutes: 45   Total Treatment Minutes: 60       [] EVAL (LOW) 70753 (typically 20 minutes face-to-face)  [] EVAL (MOD) 83685 (typically 30 minutes face-to-face)  [] EVAL (HIGH) 09695 (typically 45 minutes face-to-face)  [] RE-EVAL     [x] MK(58849) x   2  [] IONTO (33391)  [] NMR (00253) x     [x] VASO (02855)  [x] Manual (04022) x  1   [] Other:  [] TA (89888)x     [] 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 Independent in HEP and progression per patient tolerance, in order to prevent re-injury. [] Progressing: [x] Met: [] Not Met: [] Adjusted  2. Patient will have a decrease in pain to facilitate improvement in movement, function, and ADLs as indicated by Functional Deficits. [] Progressing: [x] Met: [] Not Met: [] Adjusted     Long Term Goals: To be achieved in: 8-12 weeks  1. Disability index score of 20% or less for the DASH to assist with reaching prior level of function. [x] Progressing: [] Met: [] Not Met: [] Adjusted  2. Patient will demonstrate increased AROM to [facility] to allow for proper joint functioning as indicated by patients Functional Deficits. [x] Progressing: [] Met: [] Not Met: [] Adjusted  3. Patient will demonstrate an increase in Strength to within 5lbs of contralateral shoulder to allow for proper functional mobility as indicated by patients Functional Deficits. [x] Progressing: [] Met: [] Not Met: [] Adjusted  4. Patient will return to bathing/dressing/ADLs without increased symptoms or restriction. [x] Progressing: [] Met: [] Not Met: [] Adjusted  5. Pt will tolerate reaching OH without increased symptoms    [x] Progressing: [] Met: [] Not Met: [] Adjusted    Progression Towards Functional goals:  [x] Patient is progressing as expected towards functional goals listed. [] Progression is slowed due to complexities listed. [] Progression has been slowed due to co-morbidities.   [] Plan just implemented, too soon to assess goals progression  [] Other:     ASSESSMENT:  Shoulder PROM is progressing well in OH positions but continues to have stiffness with ER and IR but felt he made some progress from using the dynasplint at home with ER. Pt instructed to begin performing sleeper IR and increasing the dynasplint with ER into increased intensity with less duration like 3 minutes vs 5. Pt advised to decrease the height with supine sh flexion due to increased anterior shoulder/ HH discomfort. Pt felt more mobility with ER after spending some time with STM to the pectoral wall. Treatment/Activity Tolerance:  [x] Patient tolerated treatment well [] Patient limited by fatique  [] Patient limited by pain/ guarding with manual therapy   Patient limited by other medical complications  [] Other:     Overall Progression Towards Functional goals/ Treatment Progress Update:  [] Patient is progressing as expected towards functional goals listed. [] Progression is slowed due to complexities/Impairments listed. [] Progression has been slowed due to co-morbidities. [x] Plan just implemented, too soon to assess goals progression <30days   [] Goals require adjustment due to lack of progress  [] Patient is not progressing as expected and requires additional follow up with physician  [] Other    Prognosis for POC: [x] Good [] Fair  [] Poor    Patient requires continued skilled intervention: [x] Yes  [] No      PLAN: Progress ROM and strength. Will contact dynasplint regarding shoulder splint at this stage. .   [x] Continue per plan of care [] Alter current plan (see comments)  [] Plan of care initiated [] Hold pending MD visit [] Discharge    Electronically signed by: Cliff Cobos PTA, [id number]  Note: If patient does not return for scheduled/recommended follow up visits, this note will serve as a discharge from care along with the most recent update on progress.

## 2022-02-24 ENCOUNTER — HOSPITAL ENCOUNTER (OUTPATIENT)
Dept: PHYSICAL THERAPY | Age: 67
Setting detail: THERAPIES SERIES
Discharge: HOME | End: 2022-02-24
Payer: MEDICARE

## 2022-02-24 PROCEDURE — 97140 MANUAL THERAPY 1/> REGIONS: CPT | Performed by: SPECIALIST/TECHNOLOGIST

## 2022-02-24 PROCEDURE — 97110 THERAPEUTIC EXERCISES: CPT | Performed by: SPECIALIST/TECHNOLOGIST

## 2022-02-24 PROCEDURE — 97016 VASOPNEUMATIC DEVICE THERAPY: CPT | Performed by: SPECIALIST/TECHNOLOGIST

## 2022-02-24 NOTE — FLOWSHEET NOTE
[employer]    Physical Therapy Treatment Note/ Progress Report:     Date:  2022    Patient Name:  Dinah Benton    :  1955  MRN: [id number]  Medical/Treatment Diagnosis Information:  · Diagnosis: s/p L shoulder rotator cuff repair 21  · Treatment Diagnosis: L shoulder pain T41.964  Insurance/Certification information:  PT Insurance Information: Medicare  Physician Information:  Referring Practitioner: Martha Alberto DO  Plan of care signed (Y/N): Pending    Date of Patient follow up with Physician: Leon Sheets 22     Progress Report: []  Yes  [x]  No     Functional Scale:   21 SPADI = 86% disability  22 SPADI = 86% disability    Date Range for reporting period:  Beginnin21  Endin22    Progress report due (10 Rx/or 30 days whichever is less):     Recertification due (POC duration/ or 90 days whichever is less): 3/21/22     Visit # Insurance Allowable Auth Needed   15  13 in    Medicare []Yes    []No     Pain level:   1-2/ 8                        Worst with AROM OP IR/ ER 5/10     SUBJECTIVE:   10 WEEKs s/p  This week Stretching prior to session today. Nicholas yesterday and is pleased with his progress  and wants him to continue with his PT and home dynasplint 2x day for ER. OBJECTIVE:     Observation:    Test measurements:      PROM (L) Shoulder:  75 deg with table slide  22 Left shoulder with palpable ST nodule with hematoma present over lateral arm deltoid into central arm. After STM this was able to move with decreased guarding with PROM. PROM flexion ~90 today w/ TENS unit and after STM   Left arm less hematoma present over biceps & central arm.  PROM ~ 100 flexion ~ 120 Abd  ER ~20 @ 30/ IR very stiff and guarded with empty end feel     ROM Left Right   Shoulder Flex 118* 149   Shoulder scaption 145 167   Shoulder ER 32* 60   Shoulder IR 55 65             22: Shoulder PROM - flexion 110 deg, abd 90 deg, ER 35 deg  2/10  Shoulder PROM left shoulder ~ 160, ABD ~ 165, ER 35 @ 90  IR 40  @ 90      RESTRICTIONS/PRECAUTIONS: large RCR, sAD, DCE -  DOS 12/14/21    Exercises/Interventions:  45'  Therapeutic Ex     min Wt / Resistance Sets/sec Reps Notes   HEP   HEP   ncline Cane press5#2 10x   5# 2/24    Door ER 15\"5x2/15SL ER to neutral 1# 3 10x 2/24   SL punch  2 10x 2/24    Prone  Extensions 5#/ 2# 2 10ea ^ row weight npv   Prone T  3 5 HEP       CC  Rows   B  Biceps curls / Triceps  25#  3# 2 10x 2/24   Serratus punches   3# 2 10x   2/24             Therapeutic Activities        [id number]  1/13   [employer]     updated   [id number]   2/8 & 2/10    Updated    [id number]   2/22    updated                                             Manual Intervention x 15'       Shoulder PROM  10 min     GH mobs, posterior and inferior  5 min Gr IV    Thoracic/Rib manipulation       CT MT/Mobs       PROM MT                     NMR re-education  10'       Supine flex  to about 90 sh height 0# 1 15x 2/22   BB ER/ IR yellow 10\" 5x 2/22                                                 Therapeutic Exercise and NMR EXR  [x] (53678) Provided verbal/tactile cueing for activities related to strengthening, flexibility, endurance, ROM  for improvements in scapular, scapulothoracic and UE control with self care, reaching, carrying, lifting, house/yardwork, driving/computer work.    [] (21293) Provided verbal/tactile cueing for activities related to improving balance, coordination, kinesthetic sense, posture, motor skill, proprioception  to assist with  scapular, scapulothoracic and UE control with self care, reaching, carrying, lifting, house/yardwork, driving/computer work.     Therapeutic Activities:    [x] (91085 or 54230) Provided verbal/tactile cueing for activities related to improving balance, coordination, kinesthetic sense, posture, motor skill, proprioception and motor activation to allow for proper function of scapular, scapulothoracic and UE control with self care, carrying, lifting, driving/computer work. Home Exercise Program:    [x] (52038) Reviewed/Progressed HEP activities related to strengthening, flexibility, endurance, ROM of scapular, scapulothoracic and UE control with self care, reaching, carrying, lifting, house/yardwork, driving/computer work  [] (64656) Reviewed/Progressed HEP activities related to improving balance, coordination, kinesthetic sense, posture, motor skill, proprioception of scapular, scapulothoracic and UE control with self care, reaching, carrying, lifting, house/yardwork, driving/computer work      Manual Treatments:  PROM / STM / Oscillations-Mobs:  G-I, II, III, IV (PA's, Inf., Post.)  [x] (69767) Provided manual therapy to mobilize soft tissue/joints of cervical/CT, scapular GHJ and UE for the purpose of modulating pain, promoting relaxation,  increasing ROM, reducing/eliminating soft tissue swelling/inflammation/restriction, improving soft tissue extensibility and allowing for proper ROM for normal function with self care, reaching, carrying, lifting, house/yardwork, driving/computer work    Modalities:  Game ready for post op swelling x 15 min       Charges:  Timed Code Treatment Minutes: 45   Total Treatment Minutes: 60       [] EVAL (LOW) 67660 (typically 20 minutes face-to-face)  [] EVAL (MOD) 00207 (typically 30 minutes face-to-face)  [] EVAL (HIGH) 80420 (typically 45 minutes face-to-face)  [] RE-EVAL     [x] HF(18289) x   2  [] IONTO (67487)  [] NMR (06718) x     [x] VASO (80486)  [x] Manual (00424) x  1   [] Other:  [] TA (78305)x     [] [employer]ch Traction (54397)  [] ES(attended) (93352)     [] ES (un) (86368):       GOALS:  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  Independent in HEP and progression per patient tolerance, in order to prevent re-injury. [] Progressing: [x] Met: [] Not Met: [] Adjusted  2. Patient will have a decrease in pain to facilitate improvement in movement, function, and ADLs as indicated by Functional Deficits. [] Progressing: [x] Met: [] Not Met: [] Adjusted     Long Term Goals: To be achieved in: 8-12 weeks  1. Disability index score of 20% or less for the DASH to assist with reaching prior level of function. [x] Progressing: [] Met: [] Not Met: [] Adjusted  2. Patient will demonstrate increased AROM to [facility] to allow for proper joint functioning as indicated by patients Functional Deficits. [x] Progressing: [] Met: [] Not Met: [] Adjusted  3. Patient will demonstrate an increase in Strength to within 5lbs of contralateral shoulder to allow for proper functional mobility as indicated by patients Functional Deficits. [x] Progressing: [] Met: [] Not Met: [] Adjusted  4. Patient will return to bathing/dressing/ADLs without increased symptoms or restriction. [x] Progressing: [] Met: [] Not Met: [] Adjusted  5. Pt will tolerate reaching OH without increased symptoms    [x] Progressing: [] Met: [] Not Met: [] Adjusted    Progression Towards Functional goals:  [x] Patient is progressing as expected towards functional goals listed. [] Progression is slowed due to complexities listed. [] Progression has been slowed due to co-morbidities. [] Plan just implemented, too soon to assess goals progression  [] Other:     ASSESSMENT:  Shoulder PROM is progressing well in OH positions but continues to have slight stiffness with ER and IR with performance of sleeper stretch at home. Pt was challenged with OH LPD, biceps/ triceps and CC rows today. Pt continues to have moderate weakness with SL ER, addition of punches and prone horizontal abduction. Pt advised to decrease the height with supine sh flexion due to increased anterior shoulder/ HH discomfort.  Pt felt more mobility with ER after spending some time with STM to the pectoral jasmin.       Treatment/Activity Tolerance:  [x] Patient tolerated treatment well [] Patient limited by fatique  [] Patient limited by pain/ guarding with manual therapy   Patient limited by other medical complications  [] Other:     Overall Progression Towards Functional goals/ Treatment Progress Update:  [] Patient is progressing as expected towards functional goals listed. [] Progression is slowed due to complexities/Impairments listed. [] Progression has been slowed due to co-morbidities. [x] Plan just implemented, too soon to assess goals progression <30days   [] Goals require adjustment due to lack of progress  [] Patient is not progressing as expected and requires additional follow up with physician  [] Other    Prognosis for POC: [x] Good [] Fair  [] Poor    Patient requires continued skilled intervention: [x] Yes  [] No      PLAN: Progress ROM and strength. Discussed HEP and stretching vs strengthening frequency with wts/ TB. [x] Continue per plan of care [] Alter current plan (see comments)  [] Plan of care initiated [] Hold pending MD visit [] Discharge    Electronically signed by: Alina Jay, PTA, [id number]  Note: If patient does not return for scheduled/recommended follow up visits, this note will serve as a discharge from care along with the most recent update on progress.

## 2022-02-25 ENCOUNTER — APPOINTMENT (OUTPATIENT)
Dept: PHYSICAL THERAPY | Age: 67
End: 2022-02-25
Payer: MEDICARE

## 2022-03-01 ENCOUNTER — HOSPITAL ENCOUNTER (OUTPATIENT)
Dept: PHYSICAL THERAPY | Age: 67
Setting detail: THERAPIES SERIES
Discharge: HOME | End: 2022-03-01
Payer: MEDICARE

## 2022-03-01 PROCEDURE — 97112 NEUROMUSCULAR REEDUCATION: CPT | Performed by: SPECIALIST/TECHNOLOGIST

## 2022-03-01 PROCEDURE — 97140 MANUAL THERAPY 1/> REGIONS: CPT | Performed by: SPECIALIST/TECHNOLOGIST

## 2022-03-01 PROCEDURE — 97110 THERAPEUTIC EXERCISES: CPT | Performed by: SPECIALIST/TECHNOLOGIST

## 2022-03-01 NOTE — FLOWSHEET NOTE
[employer]    Physical Therapy Treatment Note/ Progress Report:     Date:  3/1/2022    Patient Name:  Nnamdi Medina    :  1955  MRN: [id number]  Medical/Treatment Diagnosis Information:  · Diagnosis: s/p L shoulder rotator cuff repair 21  · Treatment Diagnosis: L shoulder pain O76.578  Insurance/Certification information:  PT Insurance Information: Medicare  Physician Information:  Referring Practitioner: Baron Lakisha DO  Plan of care signed (Y/N): Pending    Date of Patient follow up with Physician: Kalani Ken 22     Progress Report: []  Yes  [x]  No     Functional Scale:   21 SPADI = 86% disability  22 SPADI = 86% disability    Date Range for reporting period:  Beginnin21  Endin22    Progress report due (10 Rx/or 30 days whichever is less):     Recertification due (POC duration/ or 90 days whichever is less): 3/21/22     Visit # Insurance Allowable Auth Needed   15  14 in    Medicare []Yes    []No     Pain level:   1-2/ 10                        Worst with AROM OP IR/ ER 5/10     SUBJECTIVE:   10 WEEKs s/p and reports that his Left shoulder has moments of stiffness still but hasn't adjusted her Dynasplint w/ ER it is still at #2 setting. Is doing more at home with building a dresser and staining it but is using his left hand primarily. Pt admits to not doing too much OH presently. OBJECTIVE:     Observation:    Test measurements:      PROM (L) Shoulder:  75 deg with table slide  22 Left shoulder with palpable ST nodule with hematoma present over lateral arm deltoid into central arm. After STM this was able to move with decreased guarding with PROM. PROM flexion ~90 today w/ TENS unit and after STM   Left arm less hematoma present over biceps & central arm.  PROM ~ 100 flexion ~ 120 Abd  ER ~20 @ 30/ IR very stiff and guarded with empty end feel     ROM Left Right   Shoulder Flex 118* 149   Shoulder scaption 145 167   Shoulder ER 32* 60   Shoulder IR 55 65             2/1/22: Shoulder PROM - flexion 110 deg, abd 90 deg, ER 35 deg  2/10  Shoulder PROM left shoulder ~ 160, ABD ~ 165, ER 35 @ 90  IR 40  @ 90      RESTRICTIONS/PRECAUTIONS: large RCR, sAD, DCE -  DOS 12/14/21    Exercises/Interventions:  54'  Therapeutic Ex  30   min Wt / Resistance Sets/sec Reps Notes   HEP   HEP   ncline Cane press7. 5#2 10x   5# 2/24    Door ER 15\"5x3/1SL ER to neutral 2# 3 10x  3/1   SL punch green 2 10x 3/1    Prone  Extensions / 3# 2 10ea  3/1   Prone T  Palm down  Prone scaption 0# 3  2 5  5x 3/1HEP       CC  Rows   B   LPD    Left shoulder ext  Biceps curls / Triceps  30#  30#  5#  4# 2 10x 2/24  3/1  3/1   Serratus punches   4# 2 10x   3/1             Therapeutic Activities        [id number]  1/13   [employer]     updated   [id number]   2/8 & 2/10    Updated    [id number]   2/22    updated                                             Manual Intervention x 15'       Shoulder PROM  10 min     GH mobs, posterior and inferior  5 min Gr IV    Thoracic/Rib manipulation       CT MT/Mobs       PROM MT                     NMR re-education  10'       Supine flex  to about 90 sh height  green ball 2 10x  3/1   BB ER/ IR yellow 15\" 5x  3/1   Ball circles  Yellow 14 ounces  30x cw/ccw                            3/1   scaption to sh height  2 10x 3/1   Wall pushups  2 10x 3/1                            Therapeutic Exercise and NMR EXR  [x] (14241) Provided verbal/tactile cueing for activities related to strengthening, flexibility, endurance, ROM  for improvements in scapular, scapulothoracic and UE control with self care, reaching, carrying, lifting, house/yardwork, driving/computer work.    [] (32333) Provided verbal/tactile cueing for activities related to improving balance, coordination, kinesthetic sense, posture, motor skill, proprioception  to assist with  scapular, scapulothoracic and UE control with self care, reaching, carrying, lifting, house/yardwork, driving/computer work. Therapeutic Activities:    [x] (54365 or 09873) Provided verbal/tactile cueing for activities related to improving balance, coordination, kinesthetic sense, posture, motor skill, proprioception and motor activation to allow for proper function of scapular, scapulothoracic and UE control with self care, carrying, lifting, driving/computer work.      Home Exercise Program:    [x] (89678) Reviewed/Progressed HEP activities related to strengthening, flexibility, endurance, ROM of scapular, scapulothoracic and UE control with self care, reaching, carrying, lifting, house/yardwork, driving/computer work  [] (68244) Reviewed/Progressed HEP activities related to improving balance, coordination, kinesthetic sense, posture, motor skill, proprioception of scapular, scapulothoracic and UE control with self care, reaching, carrying, lifting, house/yardwork, driving/computer work      Manual Treatments:  PROM / STM / Oscillations-Mobs:  G-I, II, III, IV (PA's, Inf., Post.)  [x] (10721) Provided manual therapy to mobilize soft tissue/joints of cervical/CT, scapular GHJ and UE for the purpose of modulating pain, promoting relaxation,  increasing ROM, reducing/eliminating soft tissue swelling/inflammation/restriction, improving soft tissue extensibility and allowing for proper ROM for normal function with self care, reaching, carrying, lifting, house/yardwork, driving/computer work    Modalities:  Game ready for post op swelling x 15 min       Charges:  Timed Code Treatment Minutes: 55   Total Treatment Minutes: 70       [] EVAL (LOW) 62386 (typically 20 minutes face-to-face)  [] EVAL (MOD) 53492 (typically 30 minutes face-to-face)  [] EVAL (HIGH) 44625 (typically 45 minutes face-to-face)  [] RE-EVAL     [x] EI(26201) x   2  [] IONTO (14482)  [x] NMR (13915) x     [] VASO (95925)  [x] Manual (75118) x  1   [] Other:  [] TA (43612)x     [] Mech Traction (40541)  [] ES(attended) (32045)     [] ES (un) (51110):       GOALS:  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 Independent in HEP and progression per patient tolerance, in order to prevent re-injury. [] Progressing: [x] Met: [] Not Met: [] Adjusted  2. Patient will have a decrease in pain to facilitate improvement in movement, function, and ADLs as indicated by Functional Deficits. [] Progressing: [x] Met: [] Not Met: [] Adjusted     Long Term Goals: To be achieved in: 8-12 weeks  1. Disability index score of 20% or less for the DASH to assist with reaching prior level of function. [x] Progressing: [] Met: [] Not Met: [] Adjusted  2. Patient will demonstrate increased AROM to [facility] to allow for proper joint functioning as indicated by patients Functional Deficits. [x] Progressing: [] Met: [] Not Met: [] Adjusted  3. Patient will demonstrate an increase in Strength to within 5lbs of contralateral shoulder to allow for proper functional mobility as indicated by patients Functional Deficits. [x] Progressing: [] Met: [] Not Met: [] Adjusted  4. Patient will return to bathing/dressing/ADLs without increased symptoms or restriction. [x] Progressing: [] Met: [] Not Met: [] Adjusted  5. Pt will tolerate reaching OH without increased symptoms    [x] Progressing: [] Met: [] Not Met: [] Adjusted    Progression Towards Functional goals:  [x] Patient is progressing as expected towards functional goals listed. [] Progression is slowed due to complexities listed. [] Progression has been slowed due to co-morbidities. [] Plan just implemented, too soon to assess goals progression  [] Other:     ASSESSMENT:  Shoulder PROM is progressing well in OH positions but continues to have slight stiffness with ER and IR with left shoulder tightness due to posterior capsule tightness.   Pt was challenged with [place] SH extensions, prone scaption and standing scaptions and CC LPD today. Pt continues to have moderate weakness with SL ER with fatigue and lactic/ burning with wt. Progressions. Overall anterior shoulder pain has resolved and only has end range discomfort with PROM and flexion. Pt felt more mobility/ ROM with ER after posterior capsule GH mobilizations. Pt demonstrated good mechanics with standing OH scaptions etc    Treatment/Activity Tolerance:  [x] Patient tolerated treatment well [x] Patient limited by fatique  [] Patient limited by pain/ guarding with manual therapy   Patient limited by other medical complications  [] Other:     Overall Progression Towards Functional goals/ Treatment Progress Update:  [] Patient is progressing as expected towards functional goals listed. [] Progression is slowed due to complexities/Impairments listed. [] Progression has been slowed due to co-morbidities. [x] Plan just implemented, too soon to assess goals progression <30days   [] Goals require adjustment due to lack of progress  [] Patient is not progressing as expected and requires additional follow up with physician  [] Other    Prognosis for POC: [x] Good [] Fair  [] Poor    Patient requires continued skilled intervention: [x] Yes  [] No      PLAN: Progress ROM and strength. Discussed HEP and stretching vs strengthening frequency with wts/ TB. [x] Continue per plan of care [] Alter current plan (see comments)  [] Plan of care initiated [] Hold pending MD visit [] Discharge    Electronically signed by: Ellen Sousa PTA, [id number]  Note: If patient does not return for scheduled/recommended follow up visits, this note will serve as a discharge from care along with the most recent update on progress.

## 2022-03-03 ENCOUNTER — HOSPITAL ENCOUNTER (OUTPATIENT)
Dept: PHYSICAL THERAPY | Age: 67
Setting detail: THERAPIES SERIES
Discharge: HOME | End: 2022-03-03
Payer: MEDICARE

## 2022-03-03 PROCEDURE — 97110 THERAPEUTIC EXERCISES: CPT | Performed by: SPECIALIST/TECHNOLOGIST

## 2022-03-03 PROCEDURE — 97112 NEUROMUSCULAR REEDUCATION: CPT | Performed by: SPECIALIST/TECHNOLOGIST

## 2022-03-03 PROCEDURE — 97140 MANUAL THERAPY 1/> REGIONS: CPT | Performed by: SPECIALIST/TECHNOLOGIST

## 2022-03-03 NOTE — FLOWSHEET NOTE
[address], [facility]    Physical Therapy Treatment Note/ Progress Report:     Date:  3/3/2022    Patient Name:  Gerald Ott    :  1955  MRN: [id number]  Medical/Treatment Diagnosis Information:  · Diagnosis: s/p L shoulder rotator cuff repair 21  · Treatment Diagnosis: L shoulder pain X02.796  Insurance/Certification information:  PT Insurance Information: Medicare  Physician Information:  Referring Practitioner: Radha Knight DO  Plan of care signed (Y/N): Pending    Date of Patient follow up with Physician: Marissa Otto 22     Progress Report: []  Yes  [x]  No     Functional Scale:   21 SPADI = 86% disability  22 SPADI = 86% disability    Date Range for reporting period:  Beginnin21  Endin22    Progress report due (10 Rx/or 30 days whichever is less):     Recertification due (POC duration/ or 90 days whichever is less): 3/21/22     Visit # Insurance Allowable Auth Needed   15  15 in    Medicare []Yes    []No     Pain level:   1-2/ 10                        Worst with AROM OP IR/ ER 5/10     SUBJECTIVE:   10 wks. Pt reports his shoulder being sore after last session related to program content. Pt performed his AM stretches today and wts yesterday. OBJECTIVE:     Observation:    Test measurements:      PROM (L) Shoulder:  75 deg with table slide  22 Left shoulder with palpable ST nodule with hematoma present over lateral arm deltoid into central arm. After STM this was able to move with decreased guarding with PROM. PROM flexion ~90 today w/ TENS unit and after STM   Left arm less hematoma present over biceps & central arm.  PROM ~ 100 flexion ~ 120 Abd  ER ~20 @ 30/ IR very stiff and guarded with empty end feel     ROM Left Right   Shoulder Flex 118* 149   Shoulder scaption 145 167   Shoulder ER 32* 60   Shoulder IR 55 65             22: Shoulder PROM - flexion 110 deg, abd 90 deg, ER 35 deg  2/10  Shoulder PROM left shoulder ~ 160, ABD ~ 165, ER 35 @ 90  IR 40  @ 90      RESTRICTIONS/PRECAUTIONS: large RCR, sAD, DCE -  DOS 12/14/21    Exercises/Interventions:  54'  Therapeutic Ex  30   min Wt / Resistance Sets/sec Reps Notes   HEP   HEP   ncline Cane press7. 5#2 10x   HEP    Door ER 15\"5x3/1SL ER to neutral   SL ball drops 2#  14 ounces 3   2 sets 10x  30x  3/1  3/3   SL punch green 2 10x 3/1   Prone T  Palm down  Prone scaption 0# 3  2 5  5x 3/1HEP       CC  Rows   B   LPD    Left shoulder ext  Biceps curls   CC L press downTriceps  40#  40#  10#  5#  15# 2 10x 3/4  3/4  3/4             Therapeutic Activities        [id number]  1/13   [employer]     updated   [id number]   2/8 & 2/10    Updated    [id number]   2/22    updated                                             Manual Intervention x 15'       Shoulder PROM  10 min     GH mobs, posterior and inferior  5 min Gr IV    Thoracic/Rib manipulation       CT MT/Mobs       PROM MT                     NMR re-education  10'       Supine flex  to about 90 sh height  green ball 2 10x  3/1   BB ER/ IR   flexion palms down yellow 15\" 5x  3/1   Ball  On wall  circles  Yellow 14 ounces  30x cw/ccw                            3/1   scaption to sh height 7ounces 2 10x 3/3   Wall pushups  2 10x 3/1                            Therapeutic Exercise and NMR EXR  [x] (79283) Provided verbal/tactile cueing for activities related to strengthening, flexibility, endurance, ROM  for improvements in scapular, scapulothoracic and UE control with self care, reaching, carrying, lifting, house/yardwork, driving/computer work.    [] (50608) Provided verbal/tactile cueing for activities related to improving balance, coordination, kinesthetic sense, posture, motor skill, proprioception  to assist with  scapular, scapulothoracic and UE control with self care, reaching, carrying, lifting, house/yardwork, driving/computer work.     Therapeutic Activities:    [x] (14759 or 25774) Provided verbal/tactile cueing for activities related to improving balance, coordination, kinesthetic sense, posture, motor skill, proprioception and motor activation to allow for proper function of scapular, scapulothoracic and UE control with self care, carrying, lifting, driving/computer work.      Home Exercise Program:    [x] (02929) Reviewed/Progressed HEP activities related to strengthening, flexibility, endurance, ROM of scapular, scapulothoracic and UE control with self care, reaching, carrying, lifting, house/yardwork, driving/computer work  [] (47689) Reviewed/Progressed HEP activities related to improving balance, coordination, kinesthetic sense, posture, motor skill, proprioception of scapular, scapulothoracic and UE control with self care, reaching, carrying, lifting, house/yardwork, driving/computer work      Manual Treatments:  PROM / STM / Oscillations-Mobs:  G-I, II, III, IV (PA's, Inf., Post.)  [x] (44939) Provided manual therapy to mobilize soft tissue/joints of cervical/CT, scapular GHJ and UE for the purpose of modulating pain, promoting relaxation,  increasing ROM, reducing/eliminating soft tissue swelling/inflammation/restriction, improving soft tissue extensibility and allowing for proper ROM for normal function with self care, reaching, carrying, lifting, house/yardwork, driving/computer work    Modalities:  Game ready for post op swelling x 15 min       Charges:  Timed Code Treatment Minutes: 55   Total Treatment Minutes: 70       [] EVAL (LOW) 02982 (typically 20 minutes face-to-face)  [] EVAL (MOD) 87820 (typically 30 minutes face-to-face)  [] EVAL (HIGH) 22987 (typically 45 minutes face-to-face)  [] RE-EVAL     [x] II(19763) x   2  [] IONTO (30413)  [x] NMR (10176) x  1   [] VASO (25279)  [x] Manual (99062) x  1   [] Other:  [] TA (23356)x     [] Mech Traction (96816)  [] ES(attended) (15348)     [] ES (un) (67390):       GOALS:  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 Independent in HEP and progression per patient tolerance, in order to prevent re-injury. [] Progressing: [x] Met: [] Not Met: [] Adjusted  2. Patient will have a decrease in pain to facilitate improvement in movement, function, and ADLs as indicated by Functional Deficits. [] Progressing: [x] Met: [] Not Met: [] Adjusted     Long Term Goals: To be achieved in: 8-12 weeks  1. Disability index score of 20% or less for the DASH to assist with reaching prior level of function. [x] Progressing: [] Met: [] Not Met: [] Adjusted  2. Patient will demonstrate increased AROM to [facility] to allow for proper joint functioning as indicated by patients Functional Deficits. [x] Progressing: [] Met: [] Not Met: [] Adjusted  3. Patient will demonstrate an increase in Strength to within 5lbs of contralateral shoulder to allow for proper functional mobility as indicated by patients Functional Deficits. [x] Progressing: [] Met: [] Not Met: [] Adjusted  4. Patient will return to bathing/dressing/ADLs without increased symptoms or restriction. [x] Progressing: [] Met: [] Not Met: [] Adjusted  5. Pt will tolerate reaching OH without increased symptoms    [x] Progressing: [] Met: [] Not Met: [] Adjusted    Progression Towards Functional goals:  [x] Patient is progressing as expected towards functional goals listed. [] Progression is slowed due to complexities listed. [] Progression has been slowed due to co-morbidities. [] Plan just implemented, too soon to assess goals progression  [] Other:     ASSESSMENT:  Shoulder PROM is progressing well in OH positions but continues to have slight stiffness with IR with left shoulder tightness due to posterior capsule tightness. Pt was challenged with [place] SH  standing scaptions and CC wts progressed with LPD/ ROWS today.  Pt continues to have moderate weakness with SL ER with fatigue and lactic/ burning with wt especially with ball drops/ SL punch. Pt Progressions. Overall anterior shoulder pain has resolved and only has end range discomfort with PROM and flexion. Pt felt more mobility/ ROM with ER after posterior capsule GH mobilizations. Pt demonstrated good mechanics with standing OH scaptions etc    Treatment/Activity Tolerance:  [x] Patient tolerated treatment well [x] Patient limited by fatique  [] Patient limited by pain/ guarding with manual therapy   Patient limited by other medical complications  [] Other:     Overall Progression Towards Functional goals/ Treatment Progress Update:  [] Patient is progressing as expected towards functional goals listed. [] Progression is slowed due to complexities/Impairments listed. [] Progression has been slowed due to co-morbidities. [x] Plan just implemented, too soon to assess goals progression <30days   [] Goals require adjustment due to lack of progress  [] Patient is not progressing as expected and requires additional follow up with physician  [] Other    Prognosis for POC: [x] Good [] Fair  [] Poor    Patient requires continued skilled intervention: [x] Yes  [] No      PLAN: Progress ROM and strength. Discussed HEP and stretching vs strengthening frequency with wts/ TB. [x] Continue per plan of care [] Alter current plan (see comments)  [] Plan of care initiated [] Hold pending MD visit [] Discharge    Electronically signed by: Jordi Sawyer, PTA, [id number]  Note: If patient does not return for scheduled/recommended follow up visits, this note will serve as a discharge from care along with the most recent update on progress.

## 2022-03-08 ENCOUNTER — HOSPITAL ENCOUNTER (OUTPATIENT)
Dept: PHYSICAL THERAPY | Age: 67
Setting detail: THERAPIES SERIES
Discharge: HOME | End: 2022-03-08
Payer: MEDICARE

## 2022-03-08 PROCEDURE — 97140 MANUAL THERAPY 1/> REGIONS: CPT | Performed by: SPECIALIST/TECHNOLOGIST

## 2022-03-08 PROCEDURE — 97112 NEUROMUSCULAR REEDUCATION: CPT | Performed by: SPECIALIST/TECHNOLOGIST

## 2022-03-08 PROCEDURE — 97110 THERAPEUTIC EXERCISES: CPT | Performed by: SPECIALIST/TECHNOLOGIST

## 2022-03-08 NOTE — FLOWSHEET NOTE
[address], [employer]    Physical Therapy Treatment Note/ Progress Report:     Date:  3/8/2022    Patient Name:  Karen Deluca    :  1955  MRN: [id number]  Medical/Treatment Diagnosis Information:  · Diagnosis: s/p L shoulder rotator cuff repair 21  · Treatment Diagnosis: L shoulder pain G56.824  Insurance/Certification information:  PT Insurance Information: Medicare  Physician Information:  Referring Practitioner: Jin Morley DO  Plan of care signed (Y/N): Pending    Date of Patient follow up with Physician: Samuel Santiago 22     Progress Report: []  Yes  [x]  No     Functional Scale:   21 SPADI = 86% disability  22 SPADI = 86% disability    Date Range for reporting period:  Beginnin21  Endin22    Progress report due (10 Rx/or 30 days whichever is less):     Recertification due (POC duration/ or 90 days whichever is less): 3/21/22     Visit # Insurance Allowable Auth Needed   15  16 in    Medicare []Yes    []No     Pain level:   1-2/ 10                        Worst with AROM OP IR/ ER 5/10     SUBJECTIVE:   10 wks. OBJECTIVE:     Observation:    Test measurements:      PROM (L) Shoulder:  75 deg with table slide  22 Left shoulder with palpable ST nodule with hematoma present over lateral arm deltoid into central arm. After STM this was able to move with decreased guarding with PROM. PROM flexion ~90 today w/ TENS unit and after STM   Left arm less hematoma present over biceps & central arm.  PROM ~ 100 flexion ~ 120 Abd  ER ~20 @ 30/ IR very stiff and guarded with empty end feel     ROM Left Right   Shoulder Flex 118* 149   Shoulder scaption 145 167   Shoulder ER 32* 60   Shoulder IR 55 65             22: Shoulder PROM - flexion 110 deg, abd 90 deg, ER 35 deg  2/10  Shoulder PROM left shoulder ~ 160, ABD ~ 165, ER 35 @ 90  IR 40  @ 90      RESTRICTIONS/PRECAUTIONS: large RCR, sAD, DCE -  DOS 12/14/21    Exercises/Interventions:  54'  Therapeutic Ex  30   min Wt / Resistance Sets/sec Reps Notes   HEP   Hammock stretch  2 min HEP   Incline Cane press 7.5# 2 10x HEP    Door ER 15\"HEPSL ER to neutral   SL ball drops 2#  14 ounces 3   2 sets 12x  30x  3/8  3/3   SL punch  SL AB green 2 10xea 3/8   Prone T  Palm down  Prone scaption 0# 3  2 5  5x 3/1HEP       CC  Rows   B   LPD    Left shoulderxt   40#  40#  10#   2 10x 3/4  ^  3/4  ^ NPV  3/4   Serratus punches4#  3/8 HEP          Therapeutic Activities        [id number]  1/13   [employer]     updated   [id number]   2/8 & 2/10    Updated    [id number]   2/22    updated                                             Manual Intervention x 15'       Shoulder PROM  10 min     GH mobs, posterior and inferior  5 min Gr IV    Thoracic/Rib manipulation       CT MT/Mobs       PROM MT                     NMR re-education  10'       Supine flex  to about 90 sh height  green ball 2 10x  3/1   BB ER/ IR   flexion palms down yellow 20\" 5x  3/8   Ball  On wall  circles  Yellow 14 ounces  30x cw/ccw                            3/1   scaption to sh height 14ounces 2 10x 3/8   Wall pushups  2 12x 3/8                            Therapeutic Exercise and NMR EXR  [x] (18245) Provided verbal/tactile cueing for activities related to strengthening, flexibility, endurance, ROM  for improvements in scapular, scapulothoracic and UE control with self care, reaching, carrying, lifting, house/yardwork, driving/computer work.    [] (84064) Provided verbal/tactile cueing for activities related to improving balance, coordination, kinesthetic sense, posture, motor skill, proprioception  to assist with  scapular, scapulothoracic and UE control with self care, reaching, carrying, lifting, house/yardwork, driving/computer work.     Therapeutic Activities:    [x] (82465 or 44745) Provided verbal/tactile cueing for activities related to improving balance, coordination, kinesthetic sense, posture, motor skill, proprioception and motor activation to allow for proper function of scapular, scapulothoracic and UE control with self care, carrying, lifting, driving/computer work. Home Exercise Program:    [x] (01139) Reviewed/Progressed HEP activities related to strengthening, flexibility, endurance, ROM of scapular, scapulothoracic and UE control with self care, reaching, carrying, lifting, house/yardwork, driving/computer work  [] (41538) Reviewed/Progressed HEP activities related to improving balance, coordination, kinesthetic sense, posture, motor skill, proprioception of scapular, scapulothoracic and UE control with self care, reaching, carrying, lifting, house/yardwork, driving/computer work      Manual Treatments:  PROM / STM / Oscillations-Mobs:  G-I, II, III, IV (PA's, Inf., Post.)  [x] (06072) Provided manual therapy to mobilize soft tissue/joints of cervical/CT, scapular GHJ and UE for the purpose of modulating pain, promoting relaxation,  increasing ROM, reducing/eliminating soft tissue swelling/inflammation/restriction, improving soft tissue extensibility and allowing for proper ROM for normal function with self care, reaching, carrying, lifting, house/yardwork, driving/computer work    Modalities:  Game ready for post op swelling x 15 min       Charges:  Timed Code Treatment Minutes: 55   Total Treatment Minutes: 70       [] EVAL (LOW) 93060 (typically 20 minutes face-to-face)  [] EVAL (MOD) 53297 (typically 30 minutes face-to-face)  [] EVAL (HIGH) 42645 (typically 45 minutes face-to-face)  [] RE-EVAL     [x] FS(37887) x   2  [] IONTO (19060)  [x] NMR (73010) x  1   [] VASO (07727)  [x] Manual (08643) x  1   [] Other:  [] TA (07395)x     [] Mech Traction (46636)  [] ES(attended) (43630)     [] ES (un) (52224):       GOALS:  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 Independent in HEP and progression per patient tolerance, in order to prevent re-injury. [] Progressing: [x] Met: [] Not Met: [] Adjusted  2. Patient will have a decrease in pain to facilitate improvement in movement, function, and ADLs as indicated by Functional Deficits. [] Progressing: [x] Met: [] Not Met: [] Adjusted     Long Term Goals: To be achieved in: 8-12 weeks  1. Disability index score of 20% or less for the DASH to assist with reaching prior level of function. [x] Progressing: [] Met: [] Not Met: [] Adjusted  2. Patient will demonstrate increased AROM to [facility] to allow for proper joint functioning as indicated by patients Functional Deficits. [x] Progressing: [] Met: [] Not Met: [] Adjusted  3. Patient will demonstrate an increase in Strength to within 5lbs of contralateral shoulder to allow for proper functional mobility as indicated by patients Functional Deficits. [x] Progressing: [] Met: [] Not Met: [] Adjusted  4. Patient will return to bathing/dressing/ADLs without increased symptoms or restriction. [x] Progressing: [] Met: [] Not Met: [] Adjusted  5. Pt will tolerate reaching OH without increased symptoms    [x] Progressing: [] Met: [] Not Met: [] Adjusted    Progression Towards Functional goals:  [x] Patient is progressing as expected towards functional goals listed. [] Progression is slowed due to complexities listed. [] Progression has been slowed due to co-morbidities. [] Plan just implemented, too soon to assess goals progression  [] Other:     ASSESSMENT:  Shoulder PROM is progressing well in OH positions but continues to have slight stiffness with ER with left shoulder tightness due to anterior capsule/ pect tightness. Pt was challenged with [place]   standing scaptions and progressions in OH positions. Pt continues to have moderate weakness with SL ER with fatigue and lactic/ burning with wt especially with ball drops/ SL punch with addition of increased reps.  Overall anterior shoulder pain has resolved and only has end range discomfort with PROM and flexion. Pt felt more mobility/ ROM with ER after posterior capsule GH mobilizations. Pt demonstrated good mechanics with standing OH scaptions etc    Treatment/Activity Tolerance:  [x] Patient tolerated treatment well [x] Patient limited by fatique  [] Patient limited by pain/ guarding with manual therapy   Patient limited by other medical complications  [] Other:     Overall Progression Towards Functional goals/ Treatment Progress Update:  [] Patient is progressing as expected towards functional goals listed. [] Progression is slowed due to complexities/Impairments listed. [] Progression has been slowed due to co-morbidities. [x] Plan just implemented, too soon to assess goals progression <30days   [] Goals require adjustment due to lack of progress  [] Patient is not progressing as expected and requires additional follow up with physician  [] Other    Prognosis for POC: [x] Good [] Fair  [] Poor    Patient requires continued skilled intervention: [x] Yes  [] No      PLAN: Progress ROM and strength. Discussed HEP and stretching vs strengthening frequency with wts/ TB. [x] Continue per plan of care [] Alter current plan (see comments)  [] Plan of care initiated [] Hold pending MD visit [] Discharge    Electronically signed by: Landon Cueto, PTA, [id number]  Note: If patient does not return for scheduled/recommended follow up visits, this note will serve as a discharge from care along with the most recent update on progress.

## 2022-03-10 ENCOUNTER — HOSPITAL ENCOUNTER (OUTPATIENT)
Dept: PHYSICAL THERAPY | Age: 67
Setting detail: THERAPIES SERIES
Discharge: HOME | End: 2022-03-10
Payer: MEDICARE

## 2022-03-10 PROCEDURE — 97112 NEUROMUSCULAR REEDUCATION: CPT

## 2022-03-10 PROCEDURE — 97140 MANUAL THERAPY 1/> REGIONS: CPT

## 2022-03-10 PROCEDURE — 97110 THERAPEUTIC EXERCISES: CPT

## 2022-03-10 NOTE — FLOWSHEET NOTE
[facility] [employer]    Physical Therapy Treatment Note/ Progress Report:     Date:  3/10/2022    Patient Name:  Gerald Ott    :  1955  MRN: [id number]  Medical/Treatment Diagnosis Information:  · Diagnosis: s/p L shoulder rotator cuff repair 21  · Treatment Diagnosis: L shoulder pain X38.087  Insurance/Certification information:  PT Insurance Information: Medicare  Physician Information:  Referring Practitioner: Radha Knight DO  Plan of care signed (Y/N): Pending    Date of Patient follow up with Physician: Marissa Otto 22     Progress Report: []  Yes  [x]  No     Functional Scale:   21 SPADI = 86% disability  22 SPADI = 86% disability  22 SPADI = 33% disability    Date Range for reporting period:  Beginnin21  Endin22    Progress report due (10 Rx/or 30 days whichever is less):     Recertification due (POC duration/ or 90 days whichever is less): 3/21/22     Visit # Insurance Allowable Auth Needed    in    Medicare []Yes    []No     Pain level:   1-2/ 8                        Worst with AROM OP IR/ ER 5/10     SUBJECTIVE:   Pt is noticing big picture improvements like his ability to lie on the right side and reach [place] easier. OBJECTIVE:     Observation:    Test measurements:      PROM (L) Shoulder:  75 deg with table slide  22 Left shoulder with palpable ST nodule with hematoma present over lateral arm deltoid into central arm. After STM this was able to move with decreased guarding with PROM. PROM flexion ~90 today w/ TENS unit and after STM   Left arm less hematoma present over biceps & central arm.  PROM ~ 100 flexion ~ 120 Abd  ER ~20 @ 30/ IR very stiff and guarded with empty end feel     ROM Left Right   Shoulder Flex 118* 149   Shoulder scaption 145 167   Shoulder ER 32* 60   Shoulder IR 55 65             22: Shoulder PROM - flexion 110 deg, abd 90 deg, ER 35 deg  2/10  Shoulder PROM left shoulder ~ 160, ABD ~ 165, ER 35 @ 90  IR 40  @ 90      RESTRICTIONS/PRECAUTIONS: large RCR, sAD, DCE -  DOS 12/14/21    Exercises/Interventions:  54'  Therapeutic Ex  30   min Wt / Resistance Sets/sec Reps Notes   pulleys  5 min HEP   Hammock stretch  2 min HEP   Incline Cane press 7.5# 2 10x HEP    Door ER 15\"HEPSL ER to neutral   SL ball drops 2#  14 ounces 3   2 sets 12x  30x  3/8  3/3   SL punch  SL AB Green  14oz 2 10xea 3/8   Prone T  Palm down  Prone scaption 0# 3  3 5  5x 3/1HEP       CC  Rows   B   LPD    Left shoulderxt   40#  40#  10#   2 10x 3/4  ^  3/4  ^ NPV  3/4   Landmine press, Bilat arms 35# 3 10 Serratus punches4#  3/8 HEP                 Therapeutic Activities        [id number]  1/13   [employer]     updated   [id number]   2/8 & 2/10    Updated    [id number]   2/22    updated                                             Manual Intervention x 15'       Shoulder PROM  10 min     GH mobs, posterior and inferior  5 min Gr IV    Thoracic/Rib manipulation       CT MT/Mobs       PROM MT                     NMR re-education  10'       Supine flex  to about 90 sh height  green ball 2 10x  3/1   BB ER/ IR   flexion palms down yellow 20\" 5x  3/8   Ball  On wall  circles  Yellow 14 ounces  30x cw/ccw                            3/1   scaption to sh height 14ounces 2 10x 3/8   Wall pushups +  2 12x 3/8                            Therapeutic Exercise and NMR EXR  [x] (97864) Provided verbal/tactile cueing for activities related to strengthening, flexibility, endurance, ROM  for improvements in scapular, scapulothoracic and UE control with self care, reaching, carrying, lifting, house/yardwork, driving/computer work.    [] (05377) Provided verbal/tactile cueing for activities related to improving balance, coordination, kinesthetic sense, posture, motor skill, proprioception  to assist with  scapular, scapulothoracic and UE control with self care, reaching, carrying, lifting, house/yardwork, driving/computer work. Therapeutic Activities:    [x] (85414 or 06034) Provided verbal/tactile cueing for activities related to improving balance, coordination, kinesthetic sense, posture, motor skill, proprioception and motor activation to allow for proper function of scapular, scapulothoracic and UE control with self care, carrying, lifting, driving/computer work.      Home Exercise Program:    [x] (87345) Reviewed/Progressed HEP activities related to strengthening, flexibility, endurance, ROM of scapular, scapulothoracic and UE control with self care, reaching, carrying, lifting, house/yardwork, driving/computer work  [] (95234) Reviewed/Progressed HEP activities related to improving balance, coordination, kinesthetic sense, posture, motor skill, proprioception of scapular, scapulothoracic and UE control with self care, reaching, carrying, lifting, house/yardwork, driving/computer work      Manual Treatments:  PROM / STM / Oscillations-Mobs:  G-I, II, III, IV (PA's, Inf., Post.)  [x] (90959) Provided manual therapy to mobilize soft tissue/joints of cervical/CT, scapular GHJ and UE for the purpose of modulating pain, promoting relaxation,  increasing ROM, reducing/eliminating soft tissue swelling/inflammation/restriction, improving soft tissue extensibility and allowing for proper ROM for normal function with self care, reaching, carrying, lifting, house/yardwork, driving/computer work    Modalities:  Game ready for post op swelling x 15 min       Charges:  Timed Code Treatment Minutes: 55   Total Treatment Minutes: 55       [] EVAL (LOW) 95925 (typically 20 minutes face-to-face)  [] EVAL (MOD) 51586 (typically 30 minutes face-to-face)  [] EVAL (HIGH) 98450 (typically 45 minutes face-to-face)  [] RE-EVAL     [x] LOWRY(84399) x   2  [] IONTO (63936)  [x] NMR (15943) x  1   [] VASO (46409)  [x] Manual (69401) x  1   [] Other:  [] TA (90227)x     [] Mech Traction (43313)  [] ES(attended) (89705)     [] ES (un) (97360):       GOALS:  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 Independent in HEP and progression per patient tolerance, in order to prevent re-injury. [] Progressing: [x] Met: [] Not Met: [] Adjusted  2. Patient will have a decrease in pain to facilitate improvement in movement, function, and ADLs as indicated by Functional Deficits. [] Progressing: [x] Met: [] Not Met: [] Adjusted     Long Term Goals: To be achieved in: 8-12 weeks  1. Disability index score of 20% or less for the DASH to assist with reaching prior level of function. [x] Progressing: [] Met: [] Not Met: [] Adjusted  2. Patient will demonstrate increased AROM to [facility] to allow for proper joint functioning as indicated by patients Functional Deficits. [x] Progressing: [] Met: [] Not Met: [] Adjusted  3. Patient will demonstrate an increase in Strength to within 5lbs of contralateral shoulder to allow for proper functional mobility as indicated by patients Functional Deficits. [x] Progressing: [] Met: [] Not Met: [] Adjusted  4. Patient will return to bathing/dressing/ADLs without increased symptoms or restriction. [x] Progressing: [] Met: [] Not Met: [] Adjusted  5. Pt will tolerate reaching OH without increased symptoms    [x] Progressing: [] Met: [] Not Met: [] Adjusted    Progression Towards Functional goals:  [x] Patient is progressing as expected towards functional goals listed. [] Progression is slowed due to complexities listed. [] Progression has been slowed due to co-morbidities. [] Plan just implemented, too soon to assess goals progression  [] Other:     ASSESSMENT:  Pt is progressing very well overall. His motion is near full passively and strength is progressing as expected.   Challenged with OH planes and closed chain movements and does fatigue quickly with endurance work as seen with the bodyblade. Treatment/Activity Tolerance:  [x] Patient tolerated treatment well [x] Patient limited by fatique  [] Patient limited by pain/ guarding with manual therapy   Patient limited by other medical complications  [] Other:     Overall Progression Towards Functional goals/ Treatment Progress Update:  [x] Patient is progressing as expected towards functional goals listed. [] Progression is slowed due to complexities/Impairments listed. [] Progression has been slowed due to co-morbidities. [] Plan just implemented, too soon to assess goals progression <30days   [] Goals require adjustment due to lack of progress  [] Patient is not progressing as expected and requires additional follow up with physician  [] Other    Prognosis for POC: [x] Good [] Fair  [] Poor    Patient requires continued skilled intervention: [x] Yes  [] No      PLAN: Progress ROM and strength. Discussed HEP and stretching vs strengthening frequency with wts/ TB. [x] Continue per plan of care [] Alter current plan (see comments)  [] Plan of care initiated [] Hold pending MD visit [] Discharge    Electronically signed by: Thu Payne, PT, DPT  Note: If patient does not return for scheduled/recommended follow up visits, this note will serve as a discharge from care along with the most recent update on progress.

## 2022-03-15 ENCOUNTER — HOSPITAL ENCOUNTER (OUTPATIENT)
Dept: PHYSICAL THERAPY | Age: 67
Setting detail: THERAPIES SERIES
Discharge: HOME | End: 2022-03-15
Payer: MEDICARE

## 2022-03-15 PROCEDURE — 97140 MANUAL THERAPY 1/> REGIONS: CPT

## 2022-03-15 PROCEDURE — 97530 THERAPEUTIC ACTIVITIES: CPT

## 2022-03-15 PROCEDURE — 97110 THERAPEUTIC EXERCISES: CPT

## 2022-03-15 NOTE — FLOWSHEET NOTE
[facility]    Physical Therapy Treatment Note/ Progress Report:     Date:  3/15/2022    Patient Name:  Ty Mathis    :  1955  MRN: [id number]  Medical/Treatment Diagnosis Information:  Diagnosis: s/p L shoulder rotator cuff repair 21  Treatment Diagnosis: L shoulder pain J70.157  Insurance/Certification information:  PT Insurance Information: Medicare  Physician Information:  Referring Practitioner: Ernesto Purvis DO  Plan of care signed (Y/N): Pending    Date of Patient follow up with Physician: Jewel Aceves       Progress Report: []  Yes  [x]  No     Functional Scale:   21 SPADI = 86% disability  22 SPADI = 86% disability  22 SPADI = 33% disability  3/15/22 SPADI= 42% disability    Date Range for reporting period:  Beginnin21  Endin22    Progress report due (10 Rx/or 30 days whichever is less): 1/82/15    Recertification due (POC duration/ or 90 days whichever is less): 3/21/22     Visit # Insurance Allowable Auth Needed   22  18 in 2022   Medicare []Yes    []No     Pain level:   1/10,          5/10 with reaching outside JORJE          SUBJECTIVE:  13 weeks s/p Pt reports still having setup issues with his Dynasplint Er so hes meeting the rep here at the office but his Left shoulder continues to make good progress in ROM but admits no HEP yesterday. OBJECTIVE:    Observation: Pec tightness, TTP along the UT. Test measurements:      PROM (L) Shoulder:  75 deg with table slide  22 Left shoulder with palpable ST nodule with hematoma present over lateral arm deltoid into central arm. After STM this was able to move with decreased guarding with PROM. PROM flexion ~90 today w/ TENS unit and after STM  22 Left arm less hematoma present over biceps & central arm.  PROM ~ 100 flexion ~ 120 Abd  ER ~20 @ 30/ IR very stiff and guarded with empty end feel  22  ROM Left Right   Shoulder Flex 118* 149 Shoulder scaption 145 167   Shoulder ER 32* 60   Shoulder IR 55 65             2/1/22: Shoulder PROM - flexion 110 deg, abd 90 deg, ER 35 deg  2/10  Shoulder PROM left shoulder ~ 160, ABD ~ 165, ER 35 @ 90  IR 40  @ 90  3/15/22: Shoulder PROM left shoulder ~ 160, ABD ~ 165, ER 82 @ 90    RESTRICTIONS/PRECAUTIONS: large RCR, sAD, DCE -  DOS 12/14/21    Exercises/Interventions:  54'  Therapeutic Ex    18 min Wt / Resistance Sets/sec Reps Notes   Hammock stretch  2 min    Supine DB press 5# 2 10x    Door ER  15\" HEP   AD  3' 3/15   SL ER  SL ball drops 2#  14 ounces 3   2 sets 12x  30x  3/8  3/3   SL punch  SL AB Green  14oz 2 10xea 3/8   HEP         CC  Rows   B  LPD   Left shoulderxt  Biceps curls   CC L press downTriceps  40#  40#  10#  5#  15# 2 10x  3/ 15    HOLD   Serratus punches 5# 2 10x 3/15                    Therapeutic Activities   15 min       [id number]  1/13   [employer]    Updated   [id number]   2/8 & 2/10    Updated    [id number]   2/22    Updated          Dynasplint remeasurement/ ER  patient education regarding prognosis and plan 15   3/15                               Manual Intervention x   23 min       Shoulder PROM  10 min  All planes   GH mobs, posterior and inferior  8 min Gr 2-3   Performed in neutral, ER and mid range flexion, abd   Thoracic/Rib manipulation       CT MT/Mobs       PROM MT       STM  5 min  STM to pec minor and UT          NMR re-education         Supine flex  to about 90 sh height BB ER/ IR   flexion palms down Ball  On wall  circles  scaption to sh height Wall pushups +                           Therapeutic Exercise and NMR EXR  [x] (10948) Provided verbal/tactile cueing for activities related to strengthening, flexibility, endurance, ROM  for improvements in scapular, scapulothoracic and UE control with self care, reaching, carrying, lifting, house/yardwork, driving/computer work.    [] (99668) Provided verbal/tactile cueing for activities related to improving balance, coordination, kinesthetic sense, posture, motor skill, proprioception  to assist with  scapular, scapulothoracic and/ UE control with self care, reaching, carrying, lifting, house/yardwork, driving/computer work. Therapeutic Activities:    [x] (31779 or 71434) Provided verbal/tactile cueing for activities related to improving balance, coordination, kinesthetic sense, posture, motor skill, proprioception and motor activation to allow for proper function of scapular, scapulothoracic and UE control with self care, carrying, lifting, driving/computer work.      Home Exercise Program:    [x] (41751) Reviewed/Progressed HEP activities related to strengthening, flexibility, endurance, ROM of scapular, scapulothoracic and UE control with self care, reaching, carrying, lifting, house/yardwork, driving/computer work  [] (12997) Reviewed/Progressed HEP activities related to improving balance, coordination, kinesthetic sense, posture, motor skill, proprioception of scapular, scapulothoracic and UE control with self care, reaching, carrying, lifting, house/yardwork, driving/computer work      Manual Treatments:  PROM / STM / Oscillations-Mobs:  G-I, II, III, IV (PA's, Inf., Post.)  [x] (84072) Provided manual therapy to mobilize soft tissue/joints of cervical/CT, scapular GHJ and UE for the purpose of modulating pain, promoting relaxation,  increasing ROM, reducing/eliminating soft tissue swelling/inflammation/restriction, improving soft tissue extensibility and allowing for proper ROM for normal function with self care, reaching, carrying, lifting, house/yardwork, driving/computer work    Modalities:  Game ready for soreness and shoulder irritation following manual and dynasplint fitting x 15 min     Charges:  Timed Code Treatment Minutes:  55   Total Treatment Minutes: 70       [] EVAL (LOW) 43442 (typically 20 minutes face-to-face)  [] EVAL (MOD) 70715 (typically 30 minutes face-to-face)  [] EVAL (HIGH) 33974 (typically 45 minutes face-to-face)  [] RE-EVAL     [x] RU(73010) x   1  [] IONTO (54029)  [] NMR (53597) x     [] VASO (56533)  [x] Manual (92294) x  2   [] Other:  [x] TA (53221)x   1  [] Mech Traction (73868)  [] ES(attended) (46730)     [] ES (un) (94723):       GOALS:  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 Independent in HEP and progression per patient tolerance, in order to prevent re-injury. [] Progressing: [x] Met: [] Not Met: [] Adjusted  2. Patient will have a decrease in pain to facilitate improvement in movement, function, and ADLs as indicated by Functional Deficits. [] Progressing: [x] Met: [] Not Met: [] Adjusted     Long Term Goals: To be achieved in: 8-12 weeks  1. Disability index score of 20% or less for the DASH to assist with reaching prior level of function. [x] Progressing: [] Met: [] Not Met: [] Adjusted  2. Patient will demonstrate increased AROM to [facility] to allow for proper joint functioning as indicated by patients Functional Deficits. [x] Progressing: [] Met: [] Not Met: [] Adjusted  3. Patient will demonstrate an increase in Strength to within 5lbs of contralateral shoulder to allow for proper functional mobility as indicated by patients Functional Deficits. [x] Progressing: [] Met: [] Not Met: [] Adjusted  4. Patient will return to bathing/dressing/ADLs without increased symptoms or restriction. [x] Progressing: [] Met: [] Not Met: [] Adjusted  5. Pt will tolerate reaching OH without increased symptoms    [x] Progressing: [] Met: [] Not Met: [] Adjusted    Progression Towards Functional goals:  [x] Patient is progressing as expected towards functional goals listed. [] Progression is slowed due to complexities listed. [] Progression has been slowed due to co-morbidities.   [] Plan just implemented, too soon to assess goals progression  [] Other: ASSESSMENT:  Patient with frequent complaints of lateral arm or biceps pain which I believe to be supraspinatus referral pain. PROM and strength slowly progressing. Large progression with ER today after MFR and STM to pec and UT. Patient will continue to benefit from skilled care to improve higher level ADLs. Treatment/Activity Tolerance:  [x] Patient tolerated treatment well [x] Patient limited by fatique  [] Patient limited by pain/ guarding with manual therapy   Patient limited by other medical complications  [] Other:     Overall Progression Towards Functional goals/ Treatment Progress Update:  [x] Patient is progressing as expected towards functional goals listed. [] Progression is slowed due to complexities/Impairments listed. [] Progression has been slowed due to co-morbidities. [] Plan just implemented, too soon to assess goals progression <30days   [] Goals require adjustment due to lack of progress  [] Patient is not progressing as expected and requires additional follow up with physician  [] Other    Prognosis for POC: [x] Good [] Fair  [] Poor    Patient requires continued skilled intervention: [x] Yes  [] No      PLAN: Continue to progress end ROM. Be mindful of RTC referral pain when progressing thera-ex. [x] Continue per plan of care [] Alter current plan (see comments)  [] Plan of care initiated [] Hold pending MD visit [] Discharge    Electronically signed by: Garcia Haro, PTA, [id number]    Note: If patient does not return for scheduled/recommended follow up visits, this note will serve as a discharge from care along with the most recent update on progress.

## 2022-03-17 ENCOUNTER — HOSPITAL ENCOUNTER (OUTPATIENT)
Dept: PHYSICAL THERAPY | Age: 67
Setting detail: THERAPIES SERIES
Discharge: HOME | End: 2022-03-17
Payer: MEDICARE

## 2022-03-17 PROCEDURE — 97110 THERAPEUTIC EXERCISES: CPT

## 2022-03-17 PROCEDURE — 97140 MANUAL THERAPY 1/> REGIONS: CPT

## 2022-03-17 PROCEDURE — 97016 VASOPNEUMATIC DEVICE THERAPY: CPT

## 2022-03-17 NOTE — PROGRESS NOTES
[facility]     Physical Therapy Re-Certification Plan of Care    Dear Ronnie Valdes DO    We had the pleasure of treating the following patient for physical therapy services at [address] [place] [address]. A summary of our findings can be found in the updated assessment below. This includes our plan of care. If you have any questions or concerns regarding these findings, please do not hesitate to contact me at the office phone number checked above. Thank you for the referral.     Physician Signature:________________________________Date:__________________  By signing above (or electronic signature), therapists plan is approved by physician      Functional Outcome:  SPADI= 42% disability    Overall Response to Treatment:   [x]Patient is responding well to treatment and improvement is noted with regards to goals   []Patient should continue to improve in reasonable time if they continue HEP   []Patient has plateaued and is no longer responding to skilled PT intervention    []Patient is getting worse and would benefit from return to referring MD   []Patient unable to adhere to initial POC   []Other:     Date range of Visits: 21 - 3/17/22  Total Visits: 22    Recommendation:    [x]Continue PT 1-2x / wk for 6-8 weeks.     []Hold PT, pending MD visit      Physical Therapy Treatment Note/ Progress Report:     Date:  3/17/2022    Patient Name:  Ruchi Champagne    :  1955  MRN: [id number]  Medical/Treatment Diagnosis Information:  · Diagnosis: s/p L shoulder rotator cuff repair 21  · Treatment Diagnosis: L shoulder pain T80.140  Insurance/Certification information:  PT Insurance Information: Medicare  Physician Information:  Referring Practitioner: Ronnie Valdes DO  Plan of care signed (Y/N):     Date of Patient follow up with Physician: Ramana Ramos  22     Progress Report: [x]  Yes  []  No     Functional Scale:   21 SPADI = 86% disability  22 SPADI = 86% disability  22 SPADI = 33% disability  3/15/22 SPADI= 42% disability    Date Range for reporting period:  Beginnin22  Ending:  3/17/22    Progress report due (10 Rx/or 30 days whichever is less):     Recertification due (POC duration/ or 90 days whichever is less): 22     Visit # Insurance Allowable Auth  Medicare []Yes    [x]No     Pain level:   0-10,          5/10 with reaching outside JORJE          SUBJECTIVE:  Patient reports he is doing well and has seen significant progress in the last 2-3 weeks. Patient has been consistent with HEP and dynasplint use to improve ER. Patient still reporting pain or right shoulder strain when reaching outside his JORJE or lifting objects heavier than 1 pound.      OBJECTIVE:     Observation: Improved control of scapular compensation    Test measurements:      3/15/22: Shoulder AROM Flex: 140, Abd: 110, ER: C5/6, IR: L5  DATE 3/17/22        PROM (L) Flex: 160  Abd: 150  ER: 80  IR: 50          MMT of left shoulder  Flex: 3/5  Abd: 3-/5  ER: 3+/5  IR: 4/5    Home exercises Program:  3/17/2022: supine DB press, Supine DB OH raise, SL DB ER, SL DB abd, TB row/ext    RESTRICTIONS/PRECAUTIONS: large RCR, sAD, DCE -  DOS 21    Exercises/Interventions:   Therapeutic Ex    25 min Wt / Resistance Sets/sec Reps Notes   pulleys  5 min  HEP   Hammock stretch  2 min     Supine DB press 5# 2 10x    Door ER   15\"  HEP   Supine DB OH raises 2# 2 x10    SL ER  SL ball drops 2#  14 ounces 3   2 sets 12x  30x  3/8  3/3   SL ABD 2# 2 10xea    IR in supine with left hand BB 1'   2/15   Prone Row/ Extensions 5#/ 3# 2 10ea  3/1   Prone T  Palm down  Prone scaption 0# 3  3 5  5x 3/1   Eccentric wall slides  1 5 Discussed for HEP   Finger walks up ladder 3'      HBB stretch At treadmill 15\" 5x 2/15   TB Row / Ext    Blue/Grn 2 10x            CC  Rows   B  LPD   Left shoulderxt  Biceps curls   CC L press downTriceps 40#  40#  10#  5#  15# 2 10x  3/ 15    HOLD   Landmine press, Bilat arms 35# 3 10    Serratus punches 5# 2 10x 3/15                    Therapeutic Activities   15 min                                          Manual Intervention x   13 min       Shoulder PROM  8 min  All planes, stressed ER, IR   GH mobs, posterior and inferior  5 min Gr 2-3   Performed in neutral, ER and mid range flexion, abd   Thoracic/Rib manipulation       CT MT/Mobs       STM              NMR re-education           Therapeutic Exercise and NMR EXR  [x] (97729) Provided verbal/tactile cueing for activities related to strengthening, flexibility, endurance, ROM  for improvements in scapular, scapulothoracic and UE control with self care, reaching, carrying, lifting, house/yardwork, driving/computer work.    [] (13297) Provided verbal/tactile cueing for activities related to improving balance, coordination, kinesthetic sense, posture, motor skill, proprioception  to assist with  scapular, scapulothoracic and/ UE control with self care, reaching, carrying, lifting, house/yardwork, driving/computer work. Therapeutic Activities:    [x] (89706 or 45045) Provided verbal/tactile cueing for activities related to improving balance, coordination, kinesthetic sense, posture, motor skill, proprioception and motor activation to allow for proper function of scapular, scapulothoracic and UE control with self care, carrying, lifting, driving/computer work.      Home Exercise Program:    [x] (26026) Reviewed/Progressed HEP activities related to strengthening, flexibility, endurance, ROM of scapular, scapulothoracic and UE control with self care, reaching, carrying, lifting, house/yardwork, driving/computer work  [] (11486) Reviewed/Progressed HEP activities related to improving balance, coordination, kinesthetic sense, posture, motor skill, proprioception of scapular, scapulothoracic and UE control with self care, reaching, carrying, lifting, house/yardwork, patients Functional Deficits. [x] Progressing: [] Met: [] Not Met: [] Adjusted  3. Patient will demonstrate an increase in Strength to within 5lbs of contralateral shoulder to allow for proper functional mobility as indicated by patients Functional Deficits. [x] Progressing: [] Met: [] Not Met: [] Adjusted  4. Patient will return to bathing/dressing/ADLs without increased symptoms or restriction. [x] Progressing: [] Met: [] Not Met: [] Adjusted  5. Pt will tolerate reaching OH without increased symptoms    [x] Progressing: [] Met: [] Not Met: [] Adjusted    Progression Towards Functional goals:  [x] Patient is progressing as expected towards functional goals listed. [] Progression is slowed due to complexities listed. [] Progression has been slowed due to co-morbidities. [] Plan just implemented, too soon to assess goals progression  [] Other:     ASSESSMENT: Patient is s/p 13 weeks Large RTC repair, SAD and DCE. PROM is nearly full but lacking sufficient AROM due to strength deficits. Patient is progressing rather slow due to large nature of his repair. Patient struggling with more advanced exercises and has noted scapular compensations. Reduced several home exercises back to the table with lower weight to better bias RTC strengthening. Patient will continue to benefit from skilled therapy to address ongoing deficits and progress toward goals. Treatment/Activity Tolerance:  [x] Patient tolerated treatment well [x] Patient limited by fatique  [] Patient limited by pain/ guarding with manual therapy   Patient limited by other medical complications  [] Other:     Overall Progression Towards Functional goals/ Treatment Progress Update:  [x] Patient is progressing as expected towards functional goals listed. [] Progression is slowed due to complexities/Impairments listed. [] Progression has been slowed due to co-morbidities.   [] Plan just implemented, too soon to assess goals progression <30days   []

## 2022-03-22 ENCOUNTER — HOSPITAL ENCOUNTER (OUTPATIENT)
Dept: PHYSICAL THERAPY | Age: 67
Setting detail: THERAPIES SERIES
Discharge: HOME | End: 2022-03-22
Payer: MEDICARE

## 2022-03-22 PROCEDURE — 97110 THERAPEUTIC EXERCISES: CPT | Performed by: SPECIALIST/TECHNOLOGIST

## 2022-03-22 PROCEDURE — 97140 MANUAL THERAPY 1/> REGIONS: CPT | Performed by: SPECIALIST/TECHNOLOGIST

## 2022-03-22 PROCEDURE — 97112 NEUROMUSCULAR REEDUCATION: CPT | Performed by: SPECIALIST/TECHNOLOGIST

## 2022-03-22 NOTE — FLOWSHEET NOTE
[facility]        Physical Therapy Treatment Note    Date:  3/22/2022    Patient Name:  Sidney Anand    :  1955  MRN: [id number]  Medical/Treatment Diagnosis Information:  · Diagnosis: s/p L shoulder rotator cuff repair 21  · Treatment Diagnosis: L shoulder pain X45.601  Insurance/Certification information:  PT Insurance Information: Medicare  Physician Information:  Referring Practitioner: Bibi Ding DO  Plan of care signed (Y/N):     Date of Patient follow up with Physician: Jewel Srivastava  22     Progress Report: [x]  Yes  []  No     Functional Scale:   21 SPADI = 86% disability  22 SPADI = 86% disability  22 SPADI = 33% disability  3/15/22 SPADI= 42% disability    Date Range for reporting period:  Beginnin22  Ending:  3/17/22    Progress report due (10 Rx/or 30 days whichever is less):     Recertification due (POC duration/ or 90 days whichever is less): 22     Visit # Insurance Allowable Auth Needed    Medicare []Yes    [x]No     Pain level:   0-1/10,          5/10 with reaching outside [facility]OR[facility]          SUBJECTIVE: Pt reports using his dynasplint and has it on setting 3 and is looking to turn it up to 4 in ER  later today but finds his IR still is stiff. Feels like hes making progress but has no intentions on joining the gym.      OBJECTIVE:     Observation: Improved control of scapular compensation    Test measurements:      3/15/22: Shoulder AROM Flex: 140, Abd: 110, ER: C5/6, IR: L5  DATE 3/17/22        PROM (L) Flex: 160  Abd: 150  ER: 80  IR: 50          MMT of left shoulder  Flex: 3/5  Abd: 3-/5  ER: 3+/5  IR: 4/5    Home exercises Program:  3/17/2022: supine DB press, Supine DB OH raise, SL DB ER, SL DB abd, TB row/ext    RESTRICTIONS/PRECAUTIONS: large RCR, sAD, DCE -  DOS 21    Exercises/Interventions:   Therapeutic Ex    30 min Wt / Resistance Sets/sec Reps Notes   HEP HEP Supine DB press 6# 2 10x   3/22   Door ER   15\"   3/22   Supine DB OH raises 2# 2 x10    SL ER   3#   3   10x    3/22     SL ABD 3# 2 10xea 3/22   Prone T  Palm down  Prone scaption 7ounces 3   5xea   3/22   Eccentric wall slides flexion 1 10x  3/22        HBB stretch At treadmill 15\" 5x  3/22    HEP   Sleeper IR          3/22   CC  Rows    Left only  LPD   Left shoulder only  Biceps curls   CC L press downTriceps    ER  Left  25#  50#  15#  5#  15#  5#  2          2 10x          10x  3/22                       Therapeutic Activities                                             Manual Intervention x   11         Shoulder PROM  8 min  All planes, stressed ER, IR   GH mobs, posterior and inferior  3 min Gr 2-3   Performed in neutral, ER and mid range flexion, abd                               NMR re-education    15       Supine flex  to about 90 sh height  21 ounces  2 10x  3/22   BB ER/ IR   flexion palms down BLK  yellow 30\"  20\" 3x  5x  3/22   scaption to sh height 21ounces 2 10x 3/22     Therapeutic Exercise and NMR EXR  [x] (82729) Provided verbal/tactile cueing for activities related to strengthening, flexibility, endurance, ROM  for improvements in scapular, scapulothoracic and UE control with self care, reaching, carrying, lifting, house/yardwork, driving/computer work.    [] (98014) Provided verbal/tactile cueing for activities related to improving balance, coordination, kinesthetic sense, posture, motor skill, proprioception  to assist with  scapular, scapulothoracic and/ UE control with self care, reaching, carrying, lifting, house/yardwork, driving/computer work. Therapeutic Activities:    [x] (61273 or 71010) Provided verbal/tactile cueing for activities related to improving balance, coordination, kinesthetic sense, posture, motor skill, proprioception and motor activation to allow for proper function of scapular, scapulothoracic and UE control with self care, carrying, lifting, driving/computer work.      Home Exercise Program:    [x] (33827) Reviewed/Progressed HEP activities related to strengthening, flexibility, endurance, ROM of scapular, scapulothoracic and UE control with self care, reaching, carrying, lifting, house/yardwork, driving/computer work  [] (63433) Reviewed/Progressed HEP activities related to improving balance, coordination, kinesthetic sense, posture, motor skill, proprioception of scapular, scapulothoracic and UE control with self care, reaching, carrying, lifting, house/yardwork, driving/computer work      Manual Treatments:  PROM / STM / Oscillations-Mobs:  G-I, II, III, IV (PA's, Inf., Post.)  [x] (73444) Provided manual therapy to mobilize soft tissue/joints of cervical/CT, scapular GHJ and UE for the purpose of modulating pain, promoting relaxation,  increasing ROM, reducing/eliminating soft tissue swelling/inflammation/restriction, improving soft tissue extensibility and allowing for proper ROM for normal function with self care, reaching, carrying, lifting, house/yardwork, driving/computer work    Modalities:  Game ready for soreness and shoulder irritation following manual and thera-ex x 15 min     Charges:  Timed Code Treatment Minutes: 60   Total Treatment Minutes: 75       [] EVAL (LOW) 50157 (typically 20 minutes face-to-face)  [] EVAL (MOD) 67615 (typically 30 minutes face-to-face)  [] EVAL (HIGH) 60553 (typically 45 minutes face-to-face)  [] RE-EVAL     [x] KG(92079) x   2  [] IONTO (90689)  [x] NMR (16328) x  1   [] VASO (60005) x1  [x] Manual (26171) x  1   [] Other:  [] TA (01632)x     [] [facility] Traction (77336)  [] ES(attended) (01513)     [] ES (un) (01640):     GOALS:  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 Independent in HEP and progression per patient tolerance, in order to prevent re-injury. [] Progressing: [x] Met: [] Not Met: [] Adjusted  2. Patient will have a decrease in pain to facilitate improvement in movement, function, and ADLs as indicated by Functional Deficits. [] Progressing: [x] Met: [] Not Met: [] Adjusted     Long Term Goals: To be achieved in: 8-12 weeks  1. Disability index score of 20% or less for the DASH to assist with reaching prior level of function. [x] Progressing: [] Met: [] Not Met: [] Adjusted  2. Patient will demonstrate increased AROM to [facility] to allow for proper joint functioning as indicated by patients Functional Deficits. [x] Progressing: [] Met: [] Not Met: [] Adjusted  3. Patient will demonstrate an increase in Strength to within 5lbs of contralateral shoulder to allow for proper functional mobility as indicated by patients Functional Deficits. [x] Progressing: [] Met: [] Not Met: [] Adjusted  4. Patient will return to bathing/dressing/ADLs without increased symptoms or restriction. [x] Progressing: [] Met: [] Not Met: [] Adjusted  5. Pt will tolerate reaching OH without increased symptoms    [x] Progressing: [] Met: [] Not Met: [] Adjusted    Progression Towards Functional goals:  [x] Patient is progressing as expected towards functional goals listed. [] Progression is slowed due to complexities listed. [] Progression has been slowed due to co-morbidities. [] Plan just implemented, too soon to assess goals progression  [] Other:     ASSESSMENT: Patient is s/p 14 weeks Large RTC repair, SAD and DCE. PROM is nearly full but lacking sufficient AROM due to strength deficits. Patient is progressing rather slow due to large nature of his repair. Patient struggling with more advanced exercises and has noted scapular compensations and upper trap fatigue noted as a result. . Reduced several home exercises back to the table with lower weight to better bias RTC strengthening. Patient will continue to benefit from skilled therapy to address ongoing deficits and progress toward goals.      Treatment/Activity Tolerance:  [x] Patient tolerated treatment well [x] Patient limited by fatique  [] Patient limited by pain/ guarding with manual therapy   Patient limited by other medical complications  [] Other:     Overall Progression Towards Functional goals/ Treatment Progress Update:  [x] Patient is progressing as expected towards functional goals listed. [] Progression is slowed due to complexities/Impairments listed. [] Progression has been slowed due to co-morbidities. [] Plan just implemented, too soon to assess goals progression <30days   [] Goals require adjustment due to lack of progress  [] Patient is not progressing as expected and requires additional follow up with physician  [] Other    Prognosis for POC: [x] Good [] Fair  [] Poor    Patient requires continued skilled intervention: [x] Yes  [] No      PLAN: Continue to progress end ROM. Be mindful of RTC referral pain when progressing thera-ex. [x] Continue per plan of care [] Alter current plan (see comments)  [] Plan of care initiated [] Hold pending MD visit [] Discharge    Electronically signed by: Hao Murray, PTA, [id number]    Note: If patient does not return for scheduled/recommended follow up visits, this note will serve as a discharge from care along with the most recent update on progress.

## 2022-03-24 ENCOUNTER — APPOINTMENT (OUTPATIENT)
Dept: PHYSICAL THERAPY | Age: 67
End: 2022-03-24
Payer: MEDICARE

## 2022-03-25 ENCOUNTER — HOSPITAL ENCOUNTER (OUTPATIENT)
Dept: PHYSICAL THERAPY | Age: 67
Setting detail: THERAPIES SERIES
Discharge: HOME | End: 2022-03-25
Payer: MEDICARE

## 2022-03-25 PROCEDURE — 97140 MANUAL THERAPY 1/> REGIONS: CPT

## 2022-03-25 PROCEDURE — 97110 THERAPEUTIC EXERCISES: CPT

## 2022-03-25 PROCEDURE — 97112 NEUROMUSCULAR REEDUCATION: CPT

## 2022-03-25 PROCEDURE — 97016 VASOPNEUMATIC DEVICE THERAPY: CPT

## 2022-03-25 NOTE — FLOWSHEET NOTE
[employer] [facility] [employer]    Physical Therapy Treatment Note    Date:  3/25/2022    Patient Name:  Valencia Jha    :  1955  MRN: [id number]  Medical/Treatment Diagnosis Information:  · Diagnosis: s/p L shoulder rotator cuff repair 21  · Treatment Diagnosis: L shoulder pain P61.434  Insurance/Certification information:  PT Insurance Information: Medicare  Physician Information:  Referring Practitioner: Kayley Kaur DO  Plan of care signed (Y/N):     Date of Patient follow up with Physician:  Rodney Miller 22     Progress Report: []  Yes  [x]  No     Functional Scale:   21 SPADI = 86% disability  22 SPADI = 86% disability  22 SPADI = 33% disability  3/15/22 SPADI= 42% disability    Date Range for reporting period:  Beginning: 3/17/22  Ending:     Progress report due (10 Rx/or 30 days whichever is less):     Recertification due (POC duration/ or 90 days whichever is less): 22     Visit # Insurance Allowable Auth Needed    Medicare []Yes    [x]No     Pain level: 0/10 at rest,  5-6/10 with movement    SUBJECTIVE: Patient continues to voice complaints of pain with active shoulder motion. Voices pain normally as at the biceps or lateral arm. OBJECTIVE:     Observation: Continued with RTC strengthening as difficulty with 2# SL lateral raises is noted.     Test measurements:      3/15/22: Shoulder AROM Flex: 140, Abd: 110, ER: C5/6, IR: L5  DATE 3/17/22 3/25/22       PROM (L) Flex: 160  Abd: 150  ER: 80  IR: 50 Flex: 164  Abd: 150  ER: 80  IR: nt           Home exercises Program:  3/17/2022: supine DB press, Supine DB OH raise, SL DB ER, SL DB abd, TB row/ext    RESTRICTIONS/PRECAUTIONS: large RCR, sAD, DCE -  DOS 21    Exercises/Interventions:   Therapeutic Ex    25 min Wt / Resistance Sets/sec Reps Notes   Pulleys / Upper arm bike  3 min     Supine DB press 6# 2 x15    Door ER   15\"     Supine DB OH raises 2# 2 x10    SL ER   3#   3   10x      SL ABD 2# 2 x15    IR in supine with left hand BB 1'   2/15   Prone Row/ Extensions 5#/ 3# 2 10ea 3/1   Prone T  Palm down  Prone scaption 7ounces 3   5xea   3/22   Eccentric wall slides flexion 1 10x  3/22   Finger walks up ladder 3'      HBB stretch At treadmill 15\" 5x  3/22   TB Row / Ext  Blue/Grn 2 10x  HEP   Sleeper IR     3/22   CC Rows    (Left only)  LPD   Left shoulder only  Biceps curls   CC L press downTriceps   ER  Left  25#  50#  15#  5#  15#  5#  2          2 10x          10x 3/22             Serratus punches 5# 2 10x 3/15                    Therapeutic Activities                                             Manual Intervention  x13 min       Shoulder PROM  8 min  All planes, stressed ER, IR   GH mobs, posterior and inferior  5 min Gr 2-3   Performed in neutral, ER and mid range flexion, abd                               NMR re-education  x10 min       Supine flex to about 90 sh height w/ mirror  2#  2 x15    DB 90/90 fwd carry's  2# 1 lap x2    BB ER/ IR  flexion palms down BLK  yellow 30\"  20\" 3x  5x  3/22   Ball on wall Alphabet Tball x1     scaption to sh height 21ounces 2 10x 3/22   Wall pushups +  2 12x 3/8     Therapeutic Exercise and NMR EXR  [x] (23256) Provided verbal/tactile cueing for activities related to strengthening, flexibility, endurance, ROM  for improvements in scapular, scapulothoracic and UE control with self care, reaching, carrying, lifting, house/yardwork, driving/computer work. [x] (57852) Provided verbal/tactile cueing for activities related to improving balance, coordination, kinesthetic sense, posture, motor skill, proprioception  to assist with  scapular, scapulothoracic and/ UE control with self care, reaching, carrying, lifting, house/yardwork, driving/computer work.     Therapeutic Activities:    [] (12549 or 46454) Provided verbal/tactile cueing for activities related to improving balance, coordination, kinesthetic sense, posture, motor skill, proprioception and motor activation to allow for proper function of scapular, scapulothoracic and UE control with self care, carrying, lifting, driving/computer work. Home Exercise Program:    [] (96206) Reviewed/Progressed HEP activities related to strengthening, flexibility, endurance, ROM of scapular, scapulothoracic and UE control with self care, reaching, carrying, lifting, house/yardwork, driving/computer work  [] (15825) Reviewed/Progressed HEP activities related to improving balance, coordination, kinesthetic sense, posture, motor skill, proprioception of scapular, scapulothoracic and UE control with self care, reaching, carrying, lifting, house/yardwork, driving/computer work      Manual Treatments:  PROM / STM / Oscillations-Mobs:  G-I, II, III, IV (PA's, Inf., Post.)  [x] (91291) Provided manual therapy to mobilize soft tissue/joints of cervical/CT, scapular GHJ and UE for the purpose of modulating pain, promoting relaxation,  increasing ROM, reducing/eliminating soft tissue swelling/inflammation/restriction, improving soft tissue extensibility and allowing for proper ROM for normal function with self care, reaching, carrying, lifting, house/yardwork, driving/computer work    Modalities: Game ready following treatment for soreness and intracapsular swelling. x10    Charges:  Timed Code Treatment Minutes: 48   Total Treatment Minutes: 58       [] EVAL (LOW) 60971 (typically 20 minutes face-to-face)  [] EVAL (MOD) 09600 (typically 30 minutes face-to-face)  [] EVAL (HIGH) 81569 (typically 45 minutes face-to-face)  [] RE-EVAL     [x] CV(20121) x   1  [] IONTO (56462)  [x] NMR (11333) x  1   [x] VASO (03360) x1  [x] Manual (69209) x  1   [] Other:  [] TA (17994)x     [] [employer] (97903)  [] ES(attended) (70874)     [] ES (un) (57451):     GOALS:  Patient stated goal: return to woodworking  [] Progressing: [x] Met: [] Not Met: [] Adjusted     Therapist goals for Patient:   Short Term Goals:  To be achieved in: 2 weeks  1. Independent in HEP and progression per patient tolerance, in order to prevent re-injury. [] Progressing: [x] Met: [] Not Met: [] Adjusted  2. Patient will have a decrease in pain to facilitate improvement in movement, function, and ADLs as indicated by Functional Deficits. [] Progressing: [x] Met: [] Not Met: [] Adjusted     Long Term Goals: To be achieved in: 8-12 weeks  1. Disability index score of 20% or less for the DASH to assist with reaching prior level of function. [x] Progressing: [] Met: [] Not Met: [] Adjusted  2. Patient will demonstrate increased AROM to [facility] to allow for proper joint functioning as indicated by patients Functional Deficits. [x] Progressing: [] Met: [] Not Met: [] Adjusted  3. Patient will demonstrate an increase in Strength to within 5lbs of contralateral shoulder to allow for proper functional mobility as indicated by patients Functional Deficits. [x] Progressing: [] Met: [] Not Met: [] Adjusted  4. Patient will return to bathing/dressing/ADLs without increased symptoms or restriction. [x] Progressing: [] Met: [] Not Met: [] Adjusted  5. Pt will tolerate reaching OH without increased symptoms    [x] Progressing: [] Met: [] Not Met: [] Adjusted    Progression Towards Functional goals:  [x] Patient is progressing as expected towards functional goals listed. [] Progression is slowed due to complexities listed. [] Progression has been slowed due to co-morbidities. [] Plan just implemented, too soon to assess goals progression  [] Other:     ASSESSMENT: Patient PROM progressing into end ranges slowly due to impingement like symptoms. Continued to work on joint mobilizations and RTC strengthening. Patients overall strength level at this time is poor as he struggles to maintain humeral head stability during active motion.  Patient is progressing well at this time and will continue to benefit from skilled care      Treatment/Activity Tolerance:  [x] Patient tolerated treatment well [x] Patient limited by fatique  [] Patient limited by pain/ guarding with manual therapy   Patient limited by other medical complications  [] Other:     Overall Progression Towards Functional goals/ Treatment Progress Update:  [x] Patient is progressing as expected towards functional goals listed. [] Progression is slowed due to complexities/Impairments listed. [] Progression has been slowed due to co-morbidities. [] Plan just implemented, too soon to assess goals progression <30days   [] Goals require adjustment due to lack of progress  [] Patient is not progressing as expected and requires additional follow up with physician  [] Other    Prognosis for POC: [x] Good [] Fair  [] Poor    Patient requires continued skilled intervention: [x] Yes  [] No      PLAN:Frequency/Duration:  1-2 days per week for 8-12 Weeks:  INTERVENTIONS:Continue to progress end ROM. Be mindful of RTC referral pain when progressing thera-ex. [x] Continue per plan of care [] Alter current plan (see comments)  [] Plan of care initiated [] Hold pending MD visit [] Discharge    Electronically signed by: Aydin Alvarado PT    Note: If patient does not return for scheduled/recommended follow up visits, this note will serve as a discharge from care along with the most recent update on progress.

## 2022-03-29 ENCOUNTER — HOSPITAL ENCOUNTER (OUTPATIENT)
Dept: PHYSICAL THERAPY | Age: 67
Setting detail: THERAPIES SERIES
Discharge: HOME | End: 2022-03-29
Payer: MEDICARE

## 2022-03-29 PROCEDURE — 97112 NEUROMUSCULAR REEDUCATION: CPT | Performed by: SPECIALIST/TECHNOLOGIST

## 2022-03-29 PROCEDURE — 97016 VASOPNEUMATIC DEVICE THERAPY: CPT | Performed by: SPECIALIST/TECHNOLOGIST

## 2022-03-29 PROCEDURE — 97140 MANUAL THERAPY 1/> REGIONS: CPT | Performed by: SPECIALIST/TECHNOLOGIST

## 2022-03-29 PROCEDURE — 97110 THERAPEUTIC EXERCISES: CPT | Performed by: SPECIALIST/TECHNOLOGIST

## 2022-03-29 NOTE — FLOWSHEET NOTE
[facility] [employer]    Physical Therapy Treatment Note    Date:  3/29/2022    Patient Name:  Flor Nazario    :  1955  MRN: [id number]  Medical/Treatment Diagnosis Information:  · Diagnosis: s/p L shoulder rotator cuff repair 21  · Treatment Diagnosis: L shoulder pain L33.177  Insurance/Certification information:  PT Insurance Information: Medicare  Physician Information:  Referring Practitioner: Nuria Mahajan DO  Plan of care signed (Y/N):     Date of Patient follow up with Physician:  Kaushal Quintero 22     Progress Report: []  Yes  [x]  No     Functional Scale:   21 SPADI = 86% disability  22 SPADI = 86% disability  22 SPADI = 33% disability  3/15/22 SPADI= 42% disability    Date Range for reporting period:  Beginning: 3/17/22  Ending:     Progress report due (10 Rx/or 30 days whichever is less):     Recertification due (POC duration/ or 90 days whichever is less): 22     Visit # Insurance Allowable Auth Needed   23 Medicare []Yes    [x]No     Pain level: 0/10 at rest,  5-6/10 with movement    SUBJECTIVE: Pt reports doing some stretches this am, pt feels shoulder is doing ok. Feels the dynasplint is making him really sore and painful into ER positions. OBJECTIVE:     Observation: Continued with RTC strengthening as difficulty with 2# SL lateral raises is noted.     Test measurements:      3/15/22: Shoulder AROM Flex: 140, Abd: 110, ER: C5/6, IR: L5  DATE 3/17/22 3/25/22       PROM (L) Flex: 160  Abd: 150  ER: 80  IR: 50 Flex: 164  Abd: 150  ER: 80  IR: nt           Home exercises Program:  3/17/2022: supine DB press, Supine DB OH raise, SL DB ER, SL DB abd, TB row/ext    RESTRICTIONS/PRECAUTIONS: large RCR, sAD, DCE -  DOS 21    Exercises/Interventions:   Therapeutic Ex    25 min Wt / Resistance Sets/sec Reps Notes        REVIEWED for HEP  3/29 Supine DB press 6# 2 x15    Door ER   15\"  HEP 3/29   Supine DB OH raises 2# 2 x10 HEP 3/28   SL ER   3#   3   10x   HEP 3/28   SL ABD 2# 2 x15 HEP 3/28   Prone Row/ Extensions 5#/ 3# 2 10ea 3/28 HEP   Prone T  Palm down  Prone scaption 7ounces 3   5xea   3/28 HEP   Eccentric wall slides flexion 1 10x  HEP   Finger walks up ladder 3'      HBB stretch At treadmill 15\" 5x  3/29   Sleeper IR     3/29   CC Rows    (Left only)  LPD   Left shoulder only  EXT  Biceps curls   CC L press down Triceps   ER  Left   IR Left  25#  50#  15#  5#  15#  5#  15#  2          2 10x          10x 3/29            IR added 3/29             Serratus punches 5# 2 10x 3/28 HEP                    Therapeutic Activities          [id number] update    3/29                               Manual Intervention  x13 min       Shoulder PROM  8 min  All planes, stressed ER, IR   GH mobs, posterior and inferior  5 min Gr 2-3   Performed in neutral, ER and mid range flexion, abd                               NMR re-education  X 20 min       Supine flex to about 90 sh height w/ mirror  2#  2 x15 HEP 3/29   DB 90/90 fwd carry's  2# 1 lap x2 HEP 3/28   BB ER/ IR  flexion palms down BLK  yellow 30\"  20\" 3x  5x  3/22   Ball on wall Alphabet Tball x1     scaption to sh height 2# 2 10x 3/29   latern carry goal is 90/90 ER 5# KB 1 lap 3/29 decrease wt NPV   TB ER/ IR 90/90   ER in seated w/ foam roller(standing)  D2 flex   Standing  yellow 2 10ea 3/29   Wall pushups +  SB   2 12x 3/29     Therapeutic Exercise and NMR EXR  [x] (88613) Provided verbal/tactile cueing for activities related to strengthening, flexibility, endurance, ROM  for improvements in scapular, scapulothoracic and UE control with self care, reaching, carrying, lifting, house/yardwork, driving/computer work.     [x] (83398) Provided verbal/tactile cueing for activities related to improving balance, coordination, kinesthetic sense, posture, motor skill, proprioception  to assist with  scapular, scapulothoracic and/ UE control with self care, reaching, carrying, lifting, house/yardwork, driving/computer work. Therapeutic Activities:    [] (99735 or 04920) Provided verbal/tactile cueing for activities related to improving balance, coordination, kinesthetic sense, posture, motor skill, proprioception and motor activation to allow for proper function of scapular, scapulothoracic and UE control with self care, carrying, lifting, driving/computer work. Home Exercise Program:    [] (53692) Reviewed/Progressed HEP activities related to strengthening, flexibility, endurance, ROM of scapular, scapulothoracic and UE control with self care, reaching, carrying, lifting, house/yardwork, driving/computer work  [] (57933) Reviewed/Progressed HEP activities related to improving balance, coordination, kinesthetic sense, posture, motor skill, proprioception of scapular, scapulothoracic and UE control with self care, reaching, carrying, lifting, house/yardwork, driving/computer work      Manual Treatments:  PROM / STM / Oscillations-Mobs:  G-I, II, III, IV (PA's, Inf., Post.)  [x] (79076) Provided manual therapy to mobilize soft tissue/joints of cervical/CT, scapular GHJ and UE for the purpose of modulating pain, promoting relaxation,  increasing ROM, reducing/eliminating soft tissue swelling/inflammation/restriction, improving soft tissue extensibility and allowing for proper ROM for normal function with self care, reaching, carrying, lifting, house/yardwork, driving/computer work    Modalities: Game ready following treatment for soreness and intracapsular swelling.  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 Independent in HEP and progression per patient tolerance, in order to prevent re-injury. [] Progressing: [x] Met: [] Not Met: [] Adjusted  2. Patient will have a decrease in pain to facilitate improvement in movement, function, and ADLs as indicated by Functional Deficits. [] Progressing: [x] Met: [] Not Met: [] Adjusted     Long Term Goals: To be achieved in: 8-12 weeks  1. Disability index score of 20% or less for the DASH to assist with reaching prior level of function. [x] Progressing: [] Met: [] Not Met: [] Adjusted  2. Patient will demonstrate increased AROM to [facility] to allow for proper joint functioning as indicated by patients Functional Deficits. [x] Progressing: [] Met: [] Not Met: [] Adjusted  3. Patient will demonstrate an increase in Strength to within 5lbs of contralateral shoulder to allow for proper functional mobility as indicated by patients Functional Deficits. [x] Progressing: [] Met: [] Not Met: [] Adjusted  4. Patient will return to bathing/dressing/ADLs without increased symptoms or restriction. [x] Progressing: [] Met: [] Not Met: [] Adjusted  5. Pt will tolerate reaching OH without increased symptoms    [x] Progressing: [] Met: [] Not Met: [] Adjusted    Progression Towards Functional goals:  [x] Patient is progressing as expected towards functional goals listed. [] Progression is slowed due to complexities listed. [] Progression has been slowed due to co-morbidities. [] Plan just implemented, too soon to assess goals progression  [] Other:     ASSESSMENT: Patient PROM maintaining and gaining IR ranges slowly due to impingement like symptoms. Continued to work on joint mobilizations and RTC strengthening. Goal today was to progress strength in OH 90/90 planes today for RTC and stabilizing left shoulder. Patients overall strength level at this time is fair/poor as he struggles to maintain humeral head stability during active motion. Patient is progressing well at this time and will continue to benefit from skilled care 1x week to have patient check in for HEP     Treatment/Activity Tolerance:  [x] Patient tolerated treatment well [x] Patient limited by fatique  [] Patient limited by pain/ guarding with manual therapy   Patient limited by other medical complications  [] Other:     Overall Progression Towards Functional goals/ Treatment Progress Update:  [x] Patient is progressing as expected towards functional goals listed. [] Progression is slowed due to complexities/Impairments listed. [] Progression has been slowed due to co-morbidities. [] Plan just implemented, too soon to assess goals progression <30days   [] Goals require adjustment due to lack of progress  [] Patient is not progressing as expected and requires additional follow up with physician  [] Other    Prognosis for POC: [x] Good [] Fair  [] Poor    Patient requires continued skilled intervention: [x] Yes  [] No      PLAN:Frequency/Duration:  1-2 days per week for 8-12 Weeks:    HOLD using Dynasplint w/ HEP due to increase ER pain for days after. Re eval next week  INTERVENTIONS:Continue to progress end ROM. Be mindful of RTC referral pain when progressing thera-ex. Continue with PT 1x week until sees Nicholas again  [x] Continue per plan of care [] Alter current plan (see comments)  [] Plan of care initiated [] Hold pending MD visit [] Discharge    Electronically signed by: Laron Peñaloza, PTA, [id number]    Note: If patient does not return for scheduled/recommended follow up visits, this note will serve as a discharge from care along with the most recent update on progress.

## 2022-03-31 ENCOUNTER — APPOINTMENT (OUTPATIENT)
Dept: PHYSICAL THERAPY | Age: 67
End: 2022-03-31
Payer: MEDICARE

## 2022-04-01 ENCOUNTER — HOSPITAL ENCOUNTER (OUTPATIENT)
Dept: PHYSICAL THERAPY | Age: 67
Setting detail: THERAPIES SERIES
Discharge: HOME | End: 2022-04-01
Payer: MEDICARE

## 2022-04-01 PROCEDURE — 97110 THERAPEUTIC EXERCISES: CPT

## 2022-04-01 PROCEDURE — 97112 NEUROMUSCULAR REEDUCATION: CPT

## 2022-04-01 PROCEDURE — 97140 MANUAL THERAPY 1/> REGIONS: CPT

## 2022-04-01 NOTE — FLOWSHEET NOTE
[facility] [employer]    Physical Therapy Treatment Note    Date:  2022    Patient Name:  Jackson Underwood    :  1955  MRN: [id number]  Medical/Treatment Diagnosis Information:  · Diagnosis: s/p L shoulder rotator cuff repair 21  · Treatment Diagnosis: L shoulder pain D76.311  Insurance/Certification information:  PT Insurance Information: Medicare  Physician Information:  Referring Practitioner: Altaf Veras DO  Plan of care signed (Y/N):     Date of Patient follow up with Physician:  Esperanza Howe 22     Progress Report: []  Yes  [x]  No     Functional Scale:   21 SPADI = 86% disability  22 SPADI = 86% disability  22 SPADI = 33% disability  3/15/22 SPADI= 42% disability    Date Range for reporting period:  Beginning: 3/17/22  Ending:     Progress report due (10 Rx/or 30 days whichever is less):     Recertification due (POC duration/ or 90 days whichever is less): 22     Visit # Insurance Allowable Auth Needed   24 Medicare []Yes    [x]No     Pain level: 0/10 at rest,  2-3/10 with movement    SUBJECTIVE: Patient reports on going intermittent periods of left shoulder pain which radiates into the lateral arm. OBJECTIVE:     Observation: Patient still with noted restriction at end range or ER.  Isolated GH IR at <55 deg   Test measurements:      3/15/22: Shoulder AROM Flex: 140, Abd: 110, ER: C5/6, IR: L5  DATE 3/17/22 3/25/22 4/1/22      PROM (L) Flex: 160  Abd: 150  ER: 80  IR: 50 Flex: 164  Abd: 150  ER: 80  IR: nt Flex: 165  Abd: 160  ER: 75  IR: 52          Home exercises Program:  3/17/2022: supine DB press, Supine DB OH raise, SL DB ER, SL DB abd, TB row/ext  3/29/22: Corner pec stretch, standing shoulder flexion, Tband IR, Tband IR/ER at 90/90, shoulder D2    RESTRICTIONS/PRECAUTIONS: large RCR, sAD, DCE -  DOS 21    Exercises/Interventions:   Therapeutic Ex    x16 min Wt / Resistance Sets/sec Reps Notes Pulleys / Upper arm bike  3 min  Pulley's    REVIEWED for HEP  3/29 Supine DB press 7# 2 x15    Door ER   15\"  HEP 3/29   Supine DB OH raises 2# 2 x10 HEP 3/28   SL ER 3# 3 12 HEP 3/28   SL ABD 2# 2 x15 HEP 3/28   Prone Row/ Extensions 5#/ 3# 2 10ea 3/28 HEP   Prone T  Palm down  Prone scaption 7ounces 3   5xea   3/28 HEP   Eccentric wall slides flexion 1 10x  HEP   Finger walks up ladder 3'      HBB stretch At treadmill 15\" 5x  3/29   Tband forward flexion Yellow 3 x10    Sleeper IR     3/29   CC Rows    (Left only)  LPD   Left shoulder only  EXT  Biceps curls   CC L press down Triceps   ER Left   IR Left  25#  50#  15#  5#  15#  5#  15#  2          2 10x          10x 3/29            IR added 3/29      Serratus punches 5# 2 10x 3/28 HEP                                  Therapeutic Activities          [id number] update    3/29                               Manual Intervention  x12 min       Shoulder PROM  8 min  All planes, stressed ER, IR   GH mobs, posterior and inferior  4 min Gr 2-3   Performed in neutral, ER and mid range flexion, abd                               NMR re-education  x14 min       End range abduction on half wall 14oz 3 8    DB 90/90 fwd carry's  2# 1 lap x2           Ball on wall Alphabet Tball x1     scaption to sh height 2# 2 10x 3/29   latern carry goal is 90/90 ER 5# KB 1 lap 3/29 decrease wt NPV   TB ER/ IR 90/90   ER in seated w/ foam roller(standing)  D2 flex   Standing  yellow 1 x10    Wall pushups +  SB   2 12x 3/29     Therapeutic Exercise and NMR EXR  [x] (65752) Provided verbal/tactile cueing for activities related to strengthening, flexibility, endurance, ROM  for improvements in scapular, scapulothoracic and UE control with self care, reaching, carrying, lifting, house/yardwork, driving/computer work.     [x] (88287) Provided verbal/tactile cueing for activities related to improving balance, coordination, kinesthetic sense, posture, motor skill, proprioception  to assist with  scapular, scapulothoracic and/ UE control with self care, reaching, carrying, lifting, house/yardwork, driving/computer work. Therapeutic Activities:    [] (01314 or 63644) Provided verbal/tactile cueing for activities related to improving balance, coordination, kinesthetic sense, posture, motor skill, proprioception and motor activation to allow for proper function of scapular, scapulothoracic and UE control with self care, carrying, lifting, driving/computer work.      Home Exercise Program:    [] (46044) Reviewed/Progressed HEP activities related to strengthening, flexibility, endurance, ROM of scapular, scapulothoracic and UE control with self care, reaching, carrying, lifting, house/yardwork, driving/computer work  [] (17248) Reviewed/Progressed HEP activities related to improving balance, coordination, kinesthetic sense, posture, motor skill, proprioception of scapular, scapulothoracic and UE control with self care, reaching, carrying, lifting, house/yardwork, driving/computer work      Manual Treatments:  PROM / STM / Oscillations-Mobs:  G-I, II, III, IV (PA's, Inf., Post.)  [x] (58298) Provided manual therapy to mobilize soft tissue/joints of cervical/CT, scapular GHJ and UE for the purpose of modulating pain, promoting relaxation,  increasing ROM, reducing/eliminating soft tissue swelling/inflammation/restriction, improving soft tissue extensibility and allowing for proper ROM for normal function with self care, reaching, carrying, lifting, house/yardwork, driving/computer work    Modalities:     Charges:  Timed Code Treatment Minutes: 42 min   Total Treatment Minutes: 42 min       [] EVAL (LOW) 00463 (typically 20 minutes face-to-face)  [] EVAL (MOD) 49668 (typically 30 minutes face-to-face)  [] EVAL (HIGH) 48643 (typically 45 minutes face-to-face)  [] RE-EVAL     [x] SA(13982) x   1  [] IONTO (60599)  [x] NMR (68415) x  1   [] VASO (84281)   [x] Manual (01809) x  1   [] Other:  [] TA (92968)x [] [facility] Traction (27836)  [] ES(attended) (56331)     [] ES (un) (95777):     GOALS:  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 Independent in HEP and progression per patient tolerance, in order to prevent re-injury. [] Progressing: [x] Met: [] Not Met: [] Adjusted  2. Patient will have a decrease in pain to facilitate improvement in movement, function, and ADLs as indicated by Functional Deficits. [] Progressing: [x] Met: [] Not Met: [] Adjusted     Long Term Goals: To be achieved in: 8-12 weeks  1. Disability index score of 20% or less for the DASH to assist with reaching prior level of function. [x] Progressing: [] Met: [] Not Met: [] Adjusted  2. Patient will demonstrate increased AROM to [facility] to allow for proper joint functioning as indicated by patients Functional Deficits. [x] Progressing: [] Met: [] Not Met: [] Adjusted  3. Patient will demonstrate an increase in Strength to within 5lbs of contralateral shoulder to allow for proper functional mobility as indicated by patients Functional Deficits. [x] Progressing: [] Met: [] Not Met: [] Adjusted  4. Patient will return to bathing/dressing/ADLs without increased symptoms or restriction. [x] Progressing: [] Met: [] Not Met: [] Adjusted  5. Pt will tolerate reaching OH without increased symptoms    [x] Progressing: [] Met: [] Not Met: [] Adjusted    Progression Towards Functional goals:  [x] Patient is progressing as expected towards functional goals listed. [] Progression is slowed due to complexities listed. [] Progression has been slowed due to co-morbidities. [] Plan just implemented, too soon to assess goals progression  [] Other:     ASSESSMENT: Patient maintain PROM at this time but still struggling to improve end range ER and IR.  Excessive anterior translation of the humeral head on the glenoid causing for noted deficits. Continued with dynamic strengthening which was tolerated well. Great difficult with mid and end range abduction which we will continue to work with and OH strength improves. Treatment/Activity Tolerance:  [x] Patient tolerated treatment well [x] Patient limited by fatique  [] Patient limited by pain/ guarding with manual therapy   Patient limited by other medical complications  [] Other:     Overall Progression Towards Functional goals/ Treatment Progress Update:  [x] Patient is progressing as expected towards functional goals listed. [] Progression is slowed due to complexities/Impairments listed. [] Progression has been slowed due to co-morbidities. [] Plan just implemented, too soon to assess goals progression <30days   [] Goals require adjustment due to lack of progress  [] Patient is not progressing as expected and requires additional follow up with physician  [] Other    Prognosis for POC: [x] Good [] Fair  [] Poor    Patient requires continued skilled intervention: [x] Yes  [] No      PLAN:Frequency/Duration:  1-2 days per week for 8-12 Weeks:  INTERVENTIONS: Continue to progress end ROM. Be mindful of RTC referral pain when progressing thera-ex. Continue with PT 1-2x week until sees Nicholas again  [x] Continue per plan of care [] Alter current plan (see comments)  [] Plan of care initiated [] Hold pending MD visit [] Discharge    Electronically signed by: Charmayne Girt, PT    Note: If patient does not return for scheduled/recommended follow up visits, this note will serve as a discharge from care along with the most recent update on progress.

## 2022-04-05 ENCOUNTER — HOSPITAL ENCOUNTER (OUTPATIENT)
Dept: PHYSICAL THERAPY | Age: 67
Setting detail: THERAPIES SERIES
Discharge: HOME | End: 2022-04-05
Payer: MEDICARE

## 2022-04-05 PROCEDURE — 97112 NEUROMUSCULAR REEDUCATION: CPT | Performed by: SPECIALIST/TECHNOLOGIST

## 2022-04-05 PROCEDURE — 97016 VASOPNEUMATIC DEVICE THERAPY: CPT | Performed by: SPECIALIST/TECHNOLOGIST

## 2022-04-05 PROCEDURE — 97140 MANUAL THERAPY 1/> REGIONS: CPT | Performed by: SPECIALIST/TECHNOLOGIST

## 2022-04-05 PROCEDURE — 97110 THERAPEUTIC EXERCISES: CPT | Performed by: SPECIALIST/TECHNOLOGIST

## 2022-04-05 NOTE — FLOWSHEET NOTE
[address], [employer]    Physical Therapy Treatment Note    Date:  2022    Patient Name:  Char Doherty    :  1955  MRN: [id number]  Medical/Treatment Diagnosis Information:  · Diagnosis: s/p L shoulder rotator cuff repair 21  · Treatment Diagnosis: L shoulder pain E47.293  Insurance/Certification information:  PT Insurance Information: Medicare  Physician Information:  Referring Practitioner: Antony Fitzpatrick DO  Plan of care signed (Y/N):     Date of Patient follow up with Physician:  Alex Fernandes 22     Progress Report: []  Yes  [x]  No     Functional Scale:   21 SPADI = 86% disability  22 SPADI = 86% disability  22 SPADI = 33% disability  3/15/22 SPADI= 42% disability    Date Range for reporting period:  Beginning: 3/17/22  Ending:     Progress report due (10 Rx/or 30 days whichever is less): 3/60/96    Recertification due (POC duration/ or 90 days whichever is less): 22     Visit # Insurance Allowable Auth Needed   24 Medicare []Yes    [x]No     Pain level: 0/10 at rest,  2-3/10 with movement    SUBJECTIVE:  Left shoulder is doing well, returned the CPM b/c of aggravation with using the dynasplint into ER but is still pushing ROM in all ranges. Nicholas tomorrow, . Pt performed all of HEP yesterday with no issues. OBJECTIVE:     Observation: Patient still with noted restriction at end range or ER.  Isolated GH IR at <55 deg   Test measurements:      3/15/22: Shoulder AROM Flex: 140, Abd: 110, ER: C5/6, IR: L5  DATE 3/17/22 3/25/22 4/1/22      PROM (L) Flex: 160  Abd: 150  ER: 80  IR: 50 Flex: 164  Abd: 150  ER: 80  IR: nt Flex: 165  Abd: 160  ER: 75  IR: 52          Home exercises Program:  3/17/2022: supine DB press, Supine DB OH raise, SL DB ER, SL DB abd, TB row/ext  3/29/22: Corner pec stretch, standing shoulder flexion, Tband IR, Tband IR/ER at 90/90, shoulder D2    RESTRICTIONS/PRECAUTIONS: large RCR, sAD, DCE - DOS 12/14/21    Exercises/Interventions:  35'  Therapeutic Ex    x16 min Wt / Resistance Sets/sec Reps Notes   Pulleys /   3 min  Pulley's   Hammock stretch 2# 2 min   4/5 Supine DB press 7# 2 x15    Door ER   15\"  HEP 3/29   Supine DB OH raises 2# 2 x10 HEP 3/28   SL ER 4# 3 10 HEP  4/5   SL ABD 2# 2 x15 HEP   4/5   Seated  press 0# 2 10x 4/5   Prone T  Palm down  Prone scaption 7ounces 3   5xea   3/28 HEP    4/5HBB stretch At treadmill 15\" 5x    Tband forward flexion Yellow 3 x10    Sleeper IR      HEP   CC Rows    (Left only)  LPD   Left shoulder only  EXT  Biceps curls   CC L press down Triceps   ER Left   IR Left  25#  50#  15#  5#  15#  5#  15#  2          2 10x          10x 3/29            IR added 3/29                                     Therapeutic Activities          [id number] Amorelie update    3/29                               Manual Intervention  x12 min       Shoulder PROM  8 min  All planes, stressed ER, IR   GH mobs, posterior and inferior  4 min Gr 2-3   Performed in neutral, ER and mid range flexion, abd                               NMR re-education  x14 min       End range abduction on half wall 14oz 3 8    DB 90/90 fwd carry's  2# 1 lap x2 4/5          Ball on wall Alphabet 1.1 ball# x1  4/5   scaption to sh height 3# 2 10x  4/5   latern carry goal is 90/90 ER 5# KB 1 lap    TB ER/ IR 90/90   ER in seated w/ foam roller(standing)  D2 flex   Standing  A/A @ end of session  yellow 2      2 x25      10x     4/5    Corrected mechanics with D2 flex due to increased weakness w/ elbow flexion   Wall pushups +  SB   2 12x HEP     Therapeutic Exercise and NMR EXR  [x] (21910) Provided verbal/tactile cueing for activities related to strengthening, flexibility, endurance, ROM  for improvements in scapular, scapulothoracic and UE control with self care, reaching, carrying, lifting, house/yardwork, driving/computer work.     [x] (71460) Provided verbal/tactile cueing for activities related to improving balance, coordination, kinesthetic sense, posture, motor skill, proprioception  to assist with  scapular, scapulothoracic and/ UE control with self care, reaching, carrying, lifting, house/yardwork, driving/computer work. Therapeutic Activities:    [] (23329 or 26328) Provided verbal/tactile cueing for activities related to improving balance, coordination, kinesthetic sense, posture, motor skill, proprioception and motor activation to allow for proper function of scapular, scapulothoracic and UE control with self care, carrying, lifting, driving/computer work.      Home Exercise Program:    [] (79877) Reviewed/Progressed HEP activities related to strengthening, flexibility, endurance, ROM of scapular, scapulothoracic and UE control with self care, reaching, carrying, lifting, house/yardwork, driving/computer work  [] (07872) Reviewed/Progressed HEP activities related to improving balance, coordination, kinesthetic sense, posture, motor skill, proprioception of scapular, scapulothoracic and UE control with self care, reaching, carrying, lifting, house/yardwork, driving/computer work      Manual Treatments:  PROM / STM / Oscillations-Mobs:  G-I, II, III, IV (PA's, Inf., Post.)  [x] (88759) Provided manual therapy to mobilize soft tissue/joints of cervical/CT, scapular GHJ and UE for the purpose of modulating pain, promoting relaxation,  increasing ROM, reducing/eliminating soft tissue swelling/inflammation/restriction, improving soft tissue extensibility and allowing for proper ROM for normal function with self care, reaching, carrying, lifting, house/yardwork, driving/computer work    Modalities:     Charges:  Timed Code Treatment Minutes: 42 min   Total Treatment Minutes:  52min       [] EVAL (LOW) 80008 (typically 20 minutes face-to-face)  [] EVAL (MOD) 32367 (typically 30 minutes face-to-face)  [] EVAL (HIGH) 81498 (typically 45 minutes face-to-face)  [] RE-EVAL     [x] WQ(53729) x   1  [] IONTO (46174)  [x] NMR (18370) x  1   [x] VASO (06434)   [x] Manual (46554) x  1   [] Other:  [] TA (33000)x     [] Mech Traction (96247)  [] ES(attended) (40263)     [] ES (un) (64162):     GOALS:  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 Independent in HEP and progression per patient tolerance, in order to prevent re-injury. [] Progressing: [x] Met: [] Not Met: [] Adjusted  2. Patient will have a decrease in pain to facilitate improvement in movement, function, and ADLs as indicated by Functional Deficits. [] Progressing: [x] Met: [] Not Met: [] Adjusted     Long Term Goals: To be achieved in: 8-12 weeks  1. Disability index score of 20% or less for the DASH to assist with reaching prior level of function. [x] Progressing: [] Met: [] Not Met: [] Adjusted  2. Patient will demonstrate increased AROM to [facility] to allow for proper joint functioning as indicated by patients Functional Deficits. [x] Progressing: [] Met: [] Not Met: [] Adjusted  3. Patient will demonstrate an increase in Strength to within 5lbs of contralateral shoulder to allow for proper functional mobility as indicated by patients Functional Deficits. [x] Progressing: [] Met: [] Not Met: [] Adjusted  4. Patient will return to bathing/dressing/ADLs without increased symptoms or restriction. [x] Progressing: [] Met: [] Not Met: [] Adjusted  5. Pt will tolerate reaching OH without increased symptoms    [x] Progressing: [] Met: [] Not Met: [] Adjusted    Progression Towards Functional goals:  [x] Patient is progressing as expected towards functional goals listed. [] Progression is slowed due to complexities listed. [] Progression has been slowed due to co-morbidities.   [] Plan just implemented, too soon to assess goals progression  [] Other:     ASSESSMENT: Patient maintain ROM at this time but still struggling to improve end range IR with difficulty reaching BB still. Pt denied pain with session today but still has moments of increased weakness in OH planes with ER/ IR  In 90/90 planes. Continued with dynamic strengthening which was tolerated well but has moments where he reaches his ceiling with weakness in abduction planes, IE  D2 flexion with TB. Began performing seated  press w/ no wts to progress OH strength. Pt goal is to return to using chain saw but does admit he feels stronger. Treatment/Activity Tolerance:  [x] Patient tolerated treatment well [x] Patient limited by fatique  [] Patient limited by pain/ guarding with manual therapy   Patient limited by other medical complications  [] Other:     Overall Progression Towards Functional goals/ Treatment Progress Update:  [x] Patient is progressing as expected towards functional goals listed. [] Progression is slowed due to complexities/Impairments listed. [] Progression has been slowed due to co-morbidities. [] Plan just implemented, too soon to assess goals progression <30days   [] Goals require adjustment due to lack of progress  [] Patient is not progressing as expected and requires additional follow up with physician  [] Other    Prognosis for POC: [x] Good [] Fair  [] Poor    Patient requires continued skilled intervention: [x] Yes  [] No      PLAN:Frequency/Duration:  1-2 days per week for 8-12 Weeks:  INTERVENTIONS    Be mindful of RTC referral pain when progressing theraAnikaexYazan Nicholas tomorrow   [x] Continue per plan of care [] Alter current plan (see comments)  [] Plan of care initiated [x] Hold pending MD visit/ determine additional PT session   [] Discharge    Electronically signed by: Adair Cooley PTA, [id number]    Note: If patient does not return for scheduled/recommended follow up visits, this note will serve as a discharge from care along with the most recent update on progress.

## 2022-04-08 ENCOUNTER — HOSPITAL ENCOUNTER (OUTPATIENT)
Dept: PHYSICAL THERAPY | Age: 67
Setting detail: THERAPIES SERIES
Discharge: HOME | End: 2022-04-08
Payer: MEDICARE

## 2022-04-08 PROCEDURE — 97140 MANUAL THERAPY 1/> REGIONS: CPT

## 2022-04-08 PROCEDURE — 97110 THERAPEUTIC EXERCISES: CPT

## 2022-04-08 NOTE — FLOWSHEET NOTE
[facility] [employer]    Physical Therapy Treatment Note    Date:  2022    Patient Name:  Nellie Teran    :  1955  MRN: [id number]  Medical/Treatment Diagnosis Information:  · Diagnosis: s/p L shoulder rotator cuff repair 21  · Treatment Diagnosis: L shoulder pain N70.988  Insurance/Certification information:  PT Insurance Information: Medicare  Physician Information:  Referring Practitioner: Raúl Powell DO  Plan of care signed (Y/N):     Date of Patient follow up with Physician:       Progress Report: []  Yes  [x]  No     Functional Scale:   21 SPADI = 86% disability  22 SPADI = 86% disability  22 SPADI = 33% disability  3/15/22 SPADI= 42% disability    Date Range for reporting period:  Beginning: 3/17/22  Ending:     Progress report due (10 Rx/or 30 days whichever is less):     Recertification due (POC duration/ or 90 days whichever is less): 22     Visit # Insurance Allowable Auth Needed   25 Medicare []Yes    [x]No     Pain level: 0/10 at rest    SUBJECTIVE:  Patient reports following up with Dr. Libia Lopez who was very pleased with his progress thus far. Patient reports shoulder \"feels pretty good, still having sharp pain in the shoulder with certain movement. \" Reports still having presence of bicep pain. OBJECTIVE:     Observation: Patient with active ROM restriction, especially going into IR. Instability during exercises outside his JORJE.       Test measurements:      DATE 3/17/22 3/25/22 4/1/22 4/8/22     PROM (L) Flex: 160  Abd: 150  ER: 80  IR: 50 Flex: 164  Abd: 150  ER: 80  IR: nt Flex: 165  Abd: 160  ER: 75  IR: 52 Flex: 165  Abd: 165  ER: 85  IR: 52         Home exercises Program:  3/17/2022: supine DB press, Supine DB OH raise, SL DB ER, SL DB abd, TB row/ext  3/29/22: Corner pec stretch, standing shoulder flexion, Tband IR, Tband IR/ER at 90/90, shoulder D2    RESTRICTIONS/PRECAUTIONS: large RCR, sAD, DCE -  DOS 12/14/21    Exercises/Interventions:   Therapeutic Ex    x25 min Wt / Resistance Sets/sec Reps Notes   Pulleys  3 min  Pulley's   Hammock stretch 2# 2 min  Supine DB press 8# 3 x15    Door ER   15\"  HEP 3/29   Supine DB OH raises 2# 2 x10 HEP 3/28   SL ER 4# 3 10 HEP  4/5   SL ABD 2# 2 x15 HEP   4/5   Seated  press 0# 2 10x 4/5   Prone T  Palm down  Prone scaption 7ounces 3   5xea   3/28 HEP          Seated landmine press up 5# 3 x10    HBB stretch At treadmill 15\" 5x    Tband forward flexion Yellow 3 x10    Tband abd Yellow 3 x10    Sleeper IR      HEP   CC Rows    (Left only)  LPD   Left shoulder only  EXT  Biceps curls   CC L press down Triceps   ER Left   IR Left  25#  50#  15#  5#  15#  5#  15#  2          2 10x          10x 3/29            IR added 3/29                                     Therapeutic Activities          [id number] "[employer] update                                   Manual Intervention  x8 min       Shoulder PROM  5 min  All planes, stressed ER, IR   GH joint mobs  3 min Gr 2-3   PA and inferior mobs in neutral, ER and mid range flexion, abd                               NMR re-education  5 min       End range abduction on half wall 14oz 3 8    DB 90/90 fwd carry's  2# 1 lap x2 4/5   Body Blade  20\" 3 rounds Med/lat by side  ER/IR by side  Sup/inf in FF   Ball on wall Alphabet 1.1 ball# x1  4/5   scaption to sh height 3# 2 10x  4/5   latern carry goal is 90/90 ER 5# KB 1 lap    TB ER/IR 90/90   ER in seated w/ foam roller(standing)  D2 flex Standing  A/A @ end of session  yellow 2      2 x25      10x     4/5    Corrected mechanics with D2 flex due to increased weakness w/ elbow flexion   Wall pushups +  SB   2 12x HEP     Therapeutic Exercise and NMR EXR  [x] (07101) Provided verbal/tactile cueing for activities related to strengthening, flexibility, endurance, ROM  for improvements in scapular, scapulothoracic and UE control with self care, reaching, carrying, lifting, house/yardwork, driving/computer work. [x] (35046) Provided verbal/tactile cueing for activities related to improving balance, coordination, kinesthetic sense, posture, motor skill, proprioception  to assist with  scapular, scapulothoracic and/ UE control with self care, reaching, carrying, lifting, house/yardwork, driving/computer work. Therapeutic Activities:    [] (75436 or 69147) Provided verbal/tactile cueing for activities related to improving balance, coordination, kinesthetic sense, posture, motor skill, proprioception and motor activation to allow for proper function of scapular, scapulothoracic and UE control with self care, carrying, lifting, driving/computer work.      Home Exercise Program:    [] (43020) Reviewed/Progressed HEP activities related to strengthening, flexibility, endurance, ROM of scapular, scapulothoracic and UE control with self care, reaching, carrying, lifting, house/yardwork, driving/computer work  [] (38431) Reviewed/Progressed HEP activities related to improving balance, coordination, kinesthetic sense, posture, motor skill, proprioception of scapular, scapulothoracic and UE control with self care, reaching, carrying, lifting, house/yardwork, driving/computer work      Manual Treatments:  PROM / STM / Oscillations-Mobs:  G-I, II, III, IV (PA's, Inf., Post.)  [x] (59374) Provided manual therapy to mobilize soft tissue/joints of cervical/CT, scapular GHJ and UE for the purpose of modulating pain, promoting relaxation,  increasing ROM, reducing/eliminating soft tissue swelling/inflammation/restriction, improving soft tissue extensibility and allowing for proper ROM for normal function with self care, reaching, carrying, lifting, house/yardwork, driving/computer work    Modalities:     Charges:  Timed Code Treatment Minutes: 38 min   Total Treatment Minutes: 38 min       [] EVAL (LOW) 37617 (typically 20 minutes face-to-face)  [] EVAL (MOD) 43179 (typically 30 minutes face-to-face)  [] EVAL (HIGH) 48272 (typically 45 minutes face-to-face)  [] RE-EVAL     [x] DR(99968) x   2  [] IONTO (77821)  [] NMR (91833) x     [] VASO (63731)   [x] Manual (49488) x  1   [] Other:  [] TA (86482)x     [] Mech Traction (04345)  [] ES(attended) (40278)     [] ES (un) (54393):     GOALS:  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 Independent in HEP and progression per patient tolerance, in order to prevent re-injury. [] Progressing: [x] Met: [] Not Met: [] Adjusted  2. Patient will have a decrease in pain to facilitate improvement in movement, function, and ADLs as indicated by Functional Deficits. [] Progressing: [x] Met: [] Not Met: [] Adjusted     Long Term Goals: To be achieved in: 8-12 weeks  1. Disability index score of 20% or less for the DASH to assist with reaching prior level of function. [x] Progressing: [] Met: [] Not Met: [] Adjusted  2. Patient will demonstrate increased AROM to [facility] to allow for proper joint functioning as indicated by patients Functional Deficits. [x] Progressing: [] Met: [] Not Met: [] Adjusted  3. Patient will demonstrate an increase in Strength to within 5lbs of contralateral shoulder to allow for proper functional mobility as indicated by patients Functional Deficits. [x] Progressing: [] Met: [] Not Met: [] Adjusted  4. Patient will return to bathing/dressing/ADLs without increased symptoms or restriction. [x] Progressing: [] Met: [] Not Met: [] Adjusted  5. Pt will tolerate reaching OH without increased symptoms    [x] Progressing: [] Met: [] Not Met: [] Adjusted    Progression Towards Functional goals:  [x] Patient is progressing as expected towards functional goals listed. [] Progression is slowed due to complexities listed. [] Progression has been slowed due to co-morbidities.   [] Plan just implemented, too soon to assess goals progression  [] Other:     ASSESSMENT: Patient with further PROM progression. He is still lacking active ROM but this appears related to his RTC weakness as he is still finding great difficulty with exercises that are loaded outside his JORJE (ie. tband abd, flexion, liberty carries). Patient will continue to benefit from skilled therapy to address RTC strength deficits and improve functional movement to avoid re-injury. Treatment/Activity Tolerance:  [x] Patient tolerated treatment well   [] Patient limited by fatique  [] Patient limited by pain/ guarding with manual therapy  []Patient limited by other medical complications  [] Other:     Overall Progression Towards Functional goals/ Treatment Progress Update:  [x] Patient is progressing as expected towards functional goals listed. [] Progression is slowed due to complexities/Impairments listed. [] Progression has been slowed due to co-morbidities. [] Plan just implemented, too soon to assess goals progression <30days   [] Goals require adjustment due to lack of progress  [] Patient is not progressing as expected and requires additional follow up with physician  [] Other    Prognosis for POC: [x] Good [] Fair  [] Poor    Patient requires continued skilled intervention: [x] Yes  [] No      PLAN:Frequency/Duration:  1-2 days per week for 8-12 Weeks: Be mindful of RTC referral pain when progressing thera-ex. Focus on strengthening    [x] Continue per plan of care [] Alter current plan (see comments)  [] Plan of care initiated [x] Hold pending MD visit/ determine additional PT session  [] Discharge    Electronically signed by: Juan Shearer PT, [id number]    Note: If patient does not return for scheduled/recommended follow up visits, this note will serve as a discharge from care along with the most recent update on progress.

## 2022-04-12 ENCOUNTER — HOSPITAL ENCOUNTER (OUTPATIENT)
Dept: PHYSICAL THERAPY | Age: 67
Setting detail: THERAPIES SERIES
Discharge: HOME | End: 2022-04-12
Payer: MEDICARE

## 2022-04-12 PROCEDURE — 97110 THERAPEUTIC EXERCISES: CPT | Performed by: SPECIALIST/TECHNOLOGIST

## 2022-04-12 PROCEDURE — 97112 NEUROMUSCULAR REEDUCATION: CPT | Performed by: SPECIALIST/TECHNOLOGIST

## 2022-04-12 NOTE — FLOWSHEET NOTE
[employer]    Physical Therapy Treatment Note    Date:  2022    Patient Name:  Lacie Bishop    :  1955  MRN: [id number]  Medical/Treatment Diagnosis Information:  · Diagnosis: s/p L shoulder rotator cuff repair 21  · Treatment Diagnosis: L shoulder pain E01.585  Insurance/Certification information:  PT Insurance Information: Medicare  Physician Information:  Referring Practitioner: Frandy Jimenez DO  Plan of care signed (Y/N):     Date of Patient follow up with Physician:       Progress Report: []  Yes  [x]  No     Functional Scale:   21 SPADI = 86% disability  22 SPADI = 86% disability  22 SPADI = 33% disability  3/15/22 SPADI= 42% disability    Date Range for reporting period:  Beginning: 3/17/22  Ending:     Progress report due (10 Rx/or 30 days whichever is less): 55    Recertification due (POC duration/ or 90 days whichever is less): 22     Visit # Insurance Allowable Auth Needed   26 Medicare []Yes    [x]No     Pain level: 0/10 at rest    SUBJECTIVE:  Pt reports having no significant pain recently. Is doing some form of stretching daily and strengthening Nicholas pleased with his progress and advised not ready for using chainsaw but recommended he finish up with therapy. OBJECTIVE:     Observation: Patient with active ROM restriction, especially going into IR. Instability during exercises outside his JORJE.       Test measurements:      DATE 3/17/22 3/25/22 4/1/22 4/8/22     PROM (L) Flex: 160  Abd: 150  ER: 80  IR: 50 Flex: 164  Abd: 150  ER: 80  IR: nt Flex: 165  Abd: 160  ER: 75  IR: 52 Flex: 165  Abd: 165  ER: 85  IR: 52         Home exercises Program:  3/17/2022: supine DB press, Supine DB OH raise, SL DB ER, SL DB abd, TB row/ext  3/29/22: Corner pec stretch, standing shoulder flexion, Tband IR, Tband IR/ER at 90/90, shoulder D2    RESTRICTIONS/PRECAUTIONS: large RCR, sAD, DCE -  DOS 12/14/21    Exercises/Interventions:   Therapeutic Ex    x25 min Wt / Resistance Sets/sec Reps Notes   HEPSupine DB press 8# 2 x10 4/12   Door ER   15\"  HEP 3/29   Supine DB OH raises 2# 2 x10 HEP 3/28   SL ER 4# 3 10 HEP  4/5   SL ABD 2# 2 x15 HEP   4/5   Seated  press 2# 2 10x 4/12   Prone T  Palm down  Prone scaption 7ounces 3   5xea   3/28 HEP   UEB    4' 4/12   Seated landmine press up 5# 3 x10    HBB stretch At treadmill 15\" 5x    Tband forward flexion Yellow 3 x10    Tband Abd Yellow 3 x10    Sleeper IR      HEP   CC Rows    (Left only)  LPD   Left shoulder only  EXT  Biceps curls   CC L press down Triceps   ER Left   IR Left  25#  50#  15#  5#  15#  5#  15#  2          2 10x          10x 4/12                                              Therapeutic Activities          [id number] [facility] update                                   Manual Intervention  x8 min       Shoulder PROM  5 min  All planes, stressed ER, IR   GH joint mobs  3 min Gr 2-3   PA and inferior mobs in neutral, ER and mid range flexion, abd                               NMR re-education  5 min       End range abduction on half wall 14oz 3 8    DB 90/90 fwd carry's  2# + teal 1 lap x2 4/12   Body Blade  20\" 3 rounds Med/lat by side  ER/IR by side  Sup/inf in FF   Ball on wall Alphabet 1.1 ball# 20x  cw/ccw  4/12   scaption to sh height 3# 2 10x  4/5   latern carry goal is 90/90 ER 5# KB 1 lap    TB ER/IR 90/90   ER roller(standing)  D2 flex Standing  A/A @ end of session  Yellow  RED IR 2      2 x25      10x     4/12    Corrected mechanics with D2 flex due to increased weakness w/ elbow flexion   IR sleeper review mechanics    4/12   OH ball toss/ CP toss 4# OH   6# CP 2 10x 4/12   Wall pushups +  SB   2 12x HEP     Therapeutic Exercise and NMR EXR  [x] (02911) Provided verbal/tactile cueing for activities related to strengthening, flexibility, endurance, ROM  for improvements in scapular, scapulothoracic and UE control with self care, reaching, carrying, lifting, house/yardwork, driving/computer work. [x] (79257) Provided verbal/tactile cueing for activities related to improving balance, coordination, kinesthetic sense, posture, motor skill, proprioception  to assist with  scapular, scapulothoracic and/ UE control with self care, reaching, carrying, lifting, house/yardwork, driving/computer work. Therapeutic Activities:    [] (70447 or 84248) Provided verbal/tactile cueing for activities related to improving balance, coordination, kinesthetic sense, posture, motor skill, proprioception and motor activation to allow for proper function of scapular, scapulothoracic and UE control with self care, carrying, lifting, driving/computer work.      Home Exercise Program:    [] (08372) Reviewed/Progressed HEP activities related to strengthening, flexibility, endurance, ROM of scapular, scapulothoracic and UE control with self care, reaching, carrying, lifting, house/yardwork, driving/computer work  [] (02197) Reviewed/Progressed HEP activities related to improving balance, coordination, kinesthetic sense, posture, motor skill, proprioception of scapular, scapulothoracic and UE control with self care, reaching, carrying, lifting, house/yardwork, driving/computer work      Manual Treatments:  PROM / STM / Oscillations-Mobs:  G-I, II, III, IV (PA's, Inf., Post.)  [x] (90558) Provided manual therapy to mobilize soft tissue/joints of cervical/CT, scapular GHJ and UE for the purpose of modulating pain, promoting relaxation,  increasing ROM, reducing/eliminating soft tissue swelling/inflammation/restriction, improving soft tissue extensibility and allowing for proper ROM for normal function with self care, reaching, carrying, lifting, house/yardwork, driving/computer work    Modalities:     Charges:  Timed Code Treatment Minutes: 38 min   Total Treatment Minutes: 38 min       [] EVAL (LOW) 93560 (typically 20 minutes face-to-face)  [] EVAL (MOD) 00466 (typically 30 minutes face-to-face)  [] EVAL (HIGH) 33417 (typically 45 minutes face-to-face)  [] [address] !!      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 Independent in HEP and progression per patient tolerance, in order to prevent re-injury. [] Progressing: [x] Met: [] Not Met: [] Adjusted  2. Patient will have a decrease in pain to facilitate improvement in movement, function, and ADLs as indicated by Functional Deficits. [] Progressing: [x] Met: [] Not Met: [] Adjusted     Long Term Goals: To be achieved in: 8-12 weeks  1. Disability index score of 20% or less for the DASH to assist with reaching prior level of function. [x] Progressing: [] Met: [] Not Met: [] Adjusted  2. Patient will demonstrate increased AROM to [facility] to allow for proper joint functioning as indicated by patients Functional Deficits. [x] Progressing: [] Met: [] Not Met: [] Adjusted  3. Patient will demonstrate an increase in Strength to within 5lbs of contralateral shoulder to allow for proper functional mobility as indicated by patients Functional Deficits. [x] Progressing: [] Met: [] Not Met: [] Adjusted  4. Patient will return to bathing/dressing/ADLs without increased symptoms or restriction. [x] Progressing: [] Met: [] Not Met: [] Adjusted  5. Pt will tolerate reaching OH without increased symptoms    [x] Progressing: [] Met: [] Not Met: [] Adjusted    Progression Towards Functional goals:  [x] Patient is progressing as expected towards functional goals listed. [] Progression is slowed due to complexities listed.   [] Progression has been slowed due to co-morbidities. [] Plan just implemented, too soon to assess goals progression  [] Other:     ASSESSMENT: Patient with further AROM with IR and focused on manual stretching and reviewing sleeper stretch. He is still lacking active AROM but this appears related to his RTC weakness as he is still finding great difficulty with exercises that are loaded outside his JORJE (ie. tband abd, flexion, liberty carries). 90/90 pt challenged with OH strengthening with R loop and ball toss with  With CP & OH. Pt  Patient will continue to benefit from skilled therapy to address RTC strength deficits and improve functional movement to avoid re-injury. Treatment/Activity Tolerance:  [x] Patient tolerated treatment well   [] Patient limited by fatique  [] Patient limited by pain/ guarding with manual therapy  []Patient limited by other medical complications  [] Other:     Overall Progression Towards Functional goals/ Treatment Progress Update:  [x] Patient is progressing as expected towards functional goals listed. [] Progression is slowed due to complexities/Impairments listed. [] Progression has been slowed due to co-morbidities. [] Plan just implemented, too soon to assess goals progression <30days   [] Goals require adjustment due to lack of progress  [] Patient is not progressing as expected and requires additional follow up with physician  [] Other    Prognosis for POC: [x] Good [] Fair  [] Poor    Patient requires continued skilled intervention: [x] Yes  [] No      PLAN:Frequency/Duration:  1-2 days per week for Weeks: Be mindful of RTC referral pain when progressing thera-ex.  Focus on strengthening    [x] Continue per plan of care [] Alter current plan (see comments)  [] Plan of care initiated [x] Hold pending MD visit/ determine additional PT session  [] Discharge    Electronically signed by: Hector Walker PTA, [id number]    Note: If patient does not return for scheduled/recommended follow up visits, this note will serve as a discharge from care along with the most recent update on progress.

## 2022-04-14 ENCOUNTER — HOSPITAL ENCOUNTER (OUTPATIENT)
Dept: PHYSICAL THERAPY | Age: 67
Setting detail: THERAPIES SERIES
Discharge: HOME | End: 2022-04-14
Payer: MEDICARE

## 2022-04-14 PROCEDURE — 97112 NEUROMUSCULAR REEDUCATION: CPT

## 2022-04-14 PROCEDURE — 97530 THERAPEUTIC ACTIVITIES: CPT

## 2022-04-14 PROCEDURE — 97110 THERAPEUTIC EXERCISES: CPT

## 2022-04-14 PROCEDURE — 97140 MANUAL THERAPY 1/> REGIONS: CPT

## 2022-04-14 NOTE — FLOWSHEET NOTE
PROM (L) Flex: 33.8  Abd: 24.8  ER: 30.3  IR: 39.7 Flex: 10.0  Abd: 4.4  ER: 11.2  IR: 24.9     Home exercises Program:  3/17/2022: supine DB press, Supine DB OH raise, SL DB ER, SL DB abd, TB row/ext  3/29/22: Corner pec stretch, standing shoulder flexion, Tband IR, Tband IR/ER at 90/90, shoulder D2    RESTRICTIONS/PRECAUTIONS: large RCR, sAD, DCE -  DOS 12/14/21    Exercises/Interventions:   Therapeutic Ex    x23 min Wt / Resistance Sets/sec Reps Notes   HEPSupine DB press up       Door ER   15\"  HEP 3/29   Supine DB OH raises 2# 2 x10 HEP 3/28   SL ER 4# 3 10 HEP  4/5   SL ABD 2# 2 x15 HEP   4/5   Seated  press 2# 2 10x    Prone T  Palm down  Prone scaption 7ounces 3   5xea   3/28 HEP   UBE  3 min 4'    Seated landmine press up 9.5# 3 x10    HBB stretch At treadmill 15\" 5x    Tband forward flexion Yellow 3 x10    Tband Abd Yellow 3 x10    Sleeper IR        CC Rows  LPD   Left shoulder only  EXT  Biceps curls   CC L press down Triceps   ER Left   IR Left  30#  50#  15#  5#  15#  5#  15# 3          2 10x          10x                                   Therapeutic Activities   x8 min       [id number] [employer] update       Progress note measurements with discussion on why patient is still having shoulder deficits and should continue to monitor iADL/ADL  8 min                          Manual Intervention  x8 min       Shoulder PROM  8 min  All planes, stressed ER, IR   GH joint mobs  3 min Gr 2-3   PA and inferior mobs in neutral, ER and mid range flexion, abd                               NMR re-education  x15 min       End range abduction on half wall 14oz 3 8    DB 90/90 fwd carry's  2# + teal 1 lap x2    Body Blade  20\" ea 3 rounds Med/lat by side  ER/IR by side  Med/lat by FF  Sup/inf by FF   Ball on wall Alphabet 1.1 ball# 20x  cw/ccw     scaption to sh height 3# 2 10x  4/5   latern carry goal is 90/90 ER 5# KB 1 lap    TB ER/IR 90/90   ER roller(standing)  D2 flex Standing  A/A @ end of session Yellow  RED IR 2      2 x25      10x     Corrected mechanics with D2 flex due to increased weakness w/ elbow flexion   FR on wall w/ banded ER  3 x8    OH ball toss/ CP toss 4# OH   6# CP 2 10x    Wall pushups on half wall  3 x8      Therapeutic Exercise and NMR EXR  [x] (29657) Provided verbal/tactile cueing for activities related to strengthening, flexibility, endurance, ROM  for improvements in scapular, scapulothoracic and UE control with self care, reaching, carrying, lifting, house/yardwork, driving/computer work. [x] (32149) Provided verbal/tactile cueing for activities related to improving balance, coordination, kinesthetic sense, posture, motor skill, proprioception  to assist with  scapular, scapulothoracic and/ UE control with self care, reaching, carrying, lifting, house/yardwork, driving/computer work. Therapeutic Activities:    [x] (24876 or 13850) Provided verbal/tactile cueing for activities related to improving balance, coordination, kinesthetic sense, posture, motor skill, proprioception and motor activation to allow for proper function of scapular, scapulothoracic and UE control with self care, carrying, lifting, driving/computer work.      Home Exercise Program:    [] (04764) Reviewed/Progressed HEP activities related to strengthening, flexibility, endurance, ROM of scapular, scapulothoracic and UE control with self care, reaching, carrying, lifting, house/yardwork, driving/computer work  [] (48434) Reviewed/Progressed HEP activities related to improving balance, coordination, kinesthetic sense, posture, motor skill, proprioception of scapular, scapulothoracic and UE control with self care, reaching, carrying, lifting, house/yardwork, driving/computer work      Manual Treatments:  PROM / STM / Oscillations-Mobs:  G-I, II, III, IV (PA's, Inf., Post.)  [x] (65950) Provided manual therapy to mobilize soft tissue/joints of cervical/CT, scapular GHJ and UE for the purpose of modulating pain, promoting relaxation,  increasing ROM, reducing/eliminating soft tissue swelling/inflammation/restriction, improving soft tissue extensibility and allowing for proper ROM for normal function with self care, reaching, carrying, lifting, house/yardwork, driving/computer work    Modalities:     Charges:  Timed Code Treatment Minutes: 54 min   Total Treatment Minutes: 54 min       [] EVAL (LOW) 69100 (typically 20 minutes face-to-face)  [] EVAL (MOD) 02799 (typically 30 minutes face-to-face)  [] EVAL (HIGH) 62596 (typically 45 minutes face-to-face)  [] [address] !!      [x] HD(09290) x   1  [] IONTO (55842)  [x] NMR (50910) x   1  [] VASO (03783)   [x] Manual (11484) x   1  [] Other:  [x] TA (89522)x  1   [] Mech Traction (12143)  [] ES(attended) (82066)     [] ES (un) (93408):     GOALS:  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 Independent in HEP and progression per patient tolerance, in order to prevent re-injury. [] Progressing: [x] Met: [] Not Met: [] Adjusted  2. Patient will have a decrease in pain to facilitate improvement in movement, function, and ADLs as indicated by Functional Deficits. [] Progressing: [x] Met: [] Not Met: [] Adjusted     Long Term Goals: To be achieved in: 16-20 weeks (Goals time extended)  1. Disability index score of 20% or less for the DASH to assist with reaching prior level of function. [x] Progressing: [] Met: [] Not Met: [] Adjusted  2. Patient will demonstrate increased AROM to [facility] to allow for proper joint functioning as indicated by patients Functional Deficits. [x] Progressing: [] Met: [] Not Met: [] Adjusted  3. Patient will demonstrate an increase in Strength to within 5lbs of contralateral shoulder to allow for proper functional mobility as indicated by patients Functional Deficits.    [x] Progressing: [] Met: [] Not Met: [] Adjusted  4. Patient will return to bathing/dressing/ADLs without increased symptoms or restriction. [x] Progressing: [] Met: [] Not Met: [] Adjusted  5. Pt will tolerate reaching OH without increased symptoms    [x] Progressing: [] Met: [] Not Met: [] Adjusted    Progression Towards Functional goals:  [x] Patient is progressing as expected towards functional goals listed. [] Progression is slowed due to complexities listed. [] Progression has been slowed due to co-morbidities. [] Plan just implemented, too soon to assess goals progression  [] Other:     ASSESSMENT: Patient progressing very well at this time. He is nearly at full PROM but does have marked restriction into end ranges. He is still lacking active AROM but this appears related to his RTC weakness as he is still finding great difficulty with exercises that are loaded outside his JORJE. This is shown by dynamometer testing above. Patient will continue to benefit from guided strengthening to further progress him back into prior level of function. Extended time for long term goals at goals have not been met within 12 weeks. Treatment/Activity Tolerance:  [x] Patient tolerated treatment well   [] Patient limited by fatique  [] Patient limited by pain/ guarding with manual therapy  []Patient limited by other medical complications  [] Other:     Overall Progression Towards Functional goals/ Treatment Progress Update:  [x] Patient is progressing as expected towards functional goals listed. [] Progression is slowed due to complexities/Impairments listed. [] Progression has been slowed due to co-morbidities.   [] Plan just implemented, too soon to assess goals progression <30days   [] Goals require adjustment due to lack of progress  [] Patient is not progressing as expected and requires additional follow up with physician  [] Other    Prognosis for POC: [x] Good [] Fair  [] Poor    Patient requires continued skilled intervention: [x] Yes  [] No      PLAN:Frequency/Duration:  1-2 days per week for Weeks: Be mindful of RTC referral pain when progressing thera-ex. Focus on strengthening    [x] Continue per plan of care [] Alter current plan (see comments)  [] Plan of care initiated [] Hold pending MD visit/ determine additional PT session  [] Discharge    Electronically signed by: Dominick Collet, PT    Note: If patient does not return for scheduled/recommended follow up visits, this note will serve as a discharge from care along with the most recent update on progress.

## 2022-04-19 ENCOUNTER — APPOINTMENT (OUTPATIENT)
Dept: PHYSICAL THERAPY | Age: 67
End: 2022-04-19
Payer: MEDICARE

## 2022-04-20 ENCOUNTER — HOSPITAL ENCOUNTER (OUTPATIENT)
Dept: PHYSICAL THERAPY | Age: 67
Setting detail: THERAPIES SERIES
Discharge: HOME | End: 2022-04-20
Payer: MEDICARE

## 2022-04-20 PROCEDURE — 97112 NEUROMUSCULAR REEDUCATION: CPT

## 2022-04-20 PROCEDURE — 97140 MANUAL THERAPY 1/> REGIONS: CPT

## 2022-04-20 PROCEDURE — 97110 THERAPEUTIC EXERCISES: CPT

## 2022-04-20 NOTE — FLOWSHEET NOTE
[facility] [employer]    Physical Therapy Treatment Note    Date:  2022    Patient Name:  Devora Guerrero    :  1955  MRN: [id number]  Medical/Treatment Diagnosis Information:  · Diagnosis: s/p L shoulder rotator cuff repair 21  · Treatment Diagnosis: L shoulder pain V78.259  Insurance/Certification information:  PT Insurance Information: Medicare  Physician Information:  Referring Practitioner: Brenda Flores DO  Plan of care signed (Y/N):     Date of Patient follow up with Physician:       Progress Report: [x]  Yes  []  No     Functional Scale:   21 SPADI = 86% disability  22 SPADI = 86% disability  22 SPADI = 33% disability  3/15/22 SPADI= 42% disability  22 SPADI= 35% disability    Date Range for reporting period:  Beginning: 3/17/22  Endin22    Progress report due (10 Rx/or 30 days whichever is less): 67    Recertification due (POC duration/ or 90 days whichever is less): 22     Visit # Insurance Allowable Auth Needed   28 Medicare []Yes    [x]No     Pain level: 0/10 at rest    SUBJECTIVE: Patient states home exercises are going well. State he is worried about discharge from therapy. OBJECTIVE: Skilled care provided in flow sheet below; Heavy discussion surrounding discharge, addressing ongoing deficits and POC.     Observation:    Test measurements:      DATE 3/17/22 3/25/22 4/1/22 4/8/22 4/14/22 4/20/22   PROM (L) Flex: 160  Abd: 150  ER: 80  IR: 50 Flex: 164  Abd: 150  ER: 80  IR: nt Flex: 165  Abd: 160  ER: 75  IR: 52 Flex: 165  Abd: 165  ER: 85  IR: 52 Flex: 167  Abd: 160  ER: 75 (no OP)  IR: nt Flex: 165  Abd: 160  ER: 80 (no OP)  IR: 58   22  Strength (lb) right left   PROM (L) Flex: 33.8  Abd: 24.8  ER: 30.3  IR: 39.7 Flex: 10.0  Abd: 4.4  ER: 11.2  IR: 24.9     Home exercises Program:  3/17/2022: supine DB press, Supine DB OH raise, SL DB ER, SL DB abd, TB row/ext  3/29/22: Corner pec stretch, standing shoulder flexion, Tband IR, Tband IR/ER at 90/90, shoulder D2    RESTRICTIONS/PRECAUTIONS: large RCR, sAD, DCE -  DOS 12/14/21    Exercises/Interventions:   Therapeutic Ex    12x min Wt / Resistance Sets/sec Reps Notes   HEPSupine DB press up       Door ER   15\"  HEP 3/29   Supine DB OH raises 2# 2 x10 HEP 3/28   SL ER 4# 3 10 HEP  4/5   SL ABD 2# 2 x15 HEP   4/5   Seated  press 2# 2 10x    Prone T  Palm down  Prone scaption 7ounces 3   5xea   3/28 HEP   UBE  3 min     Seated landmine press up 10# 3 x12    HBB stretch At treadmill 15\" 5x    Tband forward flexion Yellow 3 x10    Tband Abd Yellow 3 x10    Sleeper IR        CC Rows  LPD   Left shoulder only  EXT  Biceps curls   CC L press down Triceps   ER Left   IR Left  30#  50#  15#  5#  15#  5#  15# 3          2 10x          10x    TRX row  3 x12                            Therapeutic Activities          [id number] [employer] update       Progress note measurements with discussion on why patient is still having shoulder deficits and should continue to monitor iADL/ADL  8 min                          Manual Intervention  x15 min       Shoulder PROM  12 min  All planes, stressed ER, IR   GH joint mobs  3 min Gr 2-3   PA and inferior mobs in neutral, ER and mid range flexion, abd                               NMR re-education  x20 min       End range abduction on half wall 14oz 3 8    DB 90/90 fwd carry's  2# + teal 1 lap x2    Body Blade  20\" ea 3 rounds Med/lat by side  ER/IR by side  Med/lat by FF  Sup/inf by FF   PVC w/ water  30\" on/15\" 3 rounds FF and abduction isometric holds   scaption to sh height 3# 2 10x  4/5   latern carry goal is 90/90 ER 5# KB 1 lap 3/29 decrease wt NPV   Tband D2 flexion yellow 3 x12    FR on wall w/ banded ER  3 x8    OH ball toss / CP toss 4# OH   6# CP 2 10x    Push up on raised MAT table 26\" 3 x10      Therapeutic Exercise and NMR EXR  [x] (93553) Provided verbal/tactile cueing for activities related to strengthening, flexibility, endurance, ROM  for improvements in scapular, scapulothoracic and UE control with self care, reaching, carrying, lifting, house/yardwork, driving/computer work. [x] (33282) Provided verbal/tactile cueing for activities related to improving balance, coordination, kinesthetic sense, posture, motor skill, proprioception  to assist with  scapular, scapulothoracic and/ UE control with self care, reaching, carrying, lifting, house/yardwork, driving/computer work. Therapeutic Activities:    [] (40566 or 44990) Provided verbal/tactile cueing for activities related to improving balance, coordination, kinesthetic sense, posture, motor skill, proprioception and motor activation to allow for proper function of scapular, scapulothoracic and UE control with self care, carrying, lifting, driving/computer work.      Home Exercise Program:    [x] (40516) Reviewed/Progressed HEP activities related to strengthening, flexibility, endurance, ROM of scapular, scapulothoracic and UE control with self care, reaching, carrying, lifting, house/yardwork, driving/computer work  [] (08465) Reviewed/Progressed HEP activities related to improving balance, coordination, kinesthetic sense, posture, motor skill, proprioception of scapular, scapulothoracic and UE control with self care, reaching, carrying, lifting, house/yardwork, driving/computer work      Manual Treatments:  PROM / STM / Oscillations-Mobs:  G-I, II, III, IV (PA's, Inf., Post.)  [x] (93271) Provided manual therapy to mobilize soft tissue/joints of cervical/CT, scapular GHJ and UE for the purpose of modulating pain, promoting relaxation,  increasing ROM, reducing/eliminating soft tissue swelling/inflammation/restriction, improving soft tissue extensibility and allowing for proper ROM for normal function with self care, reaching, carrying, lifting, house/yardwork, driving/computer work    Modalities:     Charges:  Timed Code Treatment Minutes: 47 min as expected towards functional goals listed. [] Progression is slowed due to complexities listed. [] Progression has been slowed due to co-morbidities. [] Plan just implemented, too soon to assess goals progression  [] Other:     ASSESSMENT: Patient progressing toward discharge. He will be following up with me next week for discharge HEP planning then again in 2-4 weeks. Plan will be to follow up with patient as needed if difficulties persist. Currently he is still having end range stiffness with AROM but this should improve with further strengthening and at home stretching. Patient with extensive questioning revolving around discharge. Treatment/Activity Tolerance:  [x] Patient tolerated treatment well   [] Patient limited by fatique  [] Patient limited by pain/ guarding with manual therapy  []Patient limited by other medical complications  [] Other:     Overall Progression Towards Functional goals/ Treatment Progress Update:  [x] Patient is progressing as expected towards functional goals listed. [] Progression is slowed due to complexities/Impairments listed. [] Progression has been slowed due to co-morbidities. [] Plan just implemented, too soon to assess goals progression <30days   [] Goals require adjustment due to lack of progress  [] Patient is not progressing as expected and requires additional follow up with physician  [] Other    Prognosis for POC: [x] Good [] Fair  [] Poor    Patient requires continued skilled intervention: [x] Yes  [] No      PLAN:Frequency/Duration:  1-2 days per week for Weeks: discharge HEP planning    [x] Continue per plan of care [] Alter current plan (see comments)  [] Plan of care initiated [] Hold pending MD visit/ determine additional PT session  [] Discharge    Electronically signed by:  Anette Altamirano PT    Note: If patient does not return for scheduled/recommended follow up visits, this note will serve as a discharge from care along with the most recent update on progress.

## 2022-04-22 ENCOUNTER — APPOINTMENT (OUTPATIENT)
Dept: PHYSICAL THERAPY | Age: 67
End: 2022-04-22
Payer: MEDICARE

## 2022-04-26 ENCOUNTER — HOSPITAL ENCOUNTER (OUTPATIENT)
Dept: PHYSICAL THERAPY | Age: 67
Setting detail: THERAPIES SERIES
Discharge: HOME | End: 2022-04-26
Payer: MEDICARE

## 2022-04-26 PROCEDURE — 97112 NEUROMUSCULAR REEDUCATION: CPT

## 2022-04-26 PROCEDURE — 97110 THERAPEUTIC EXERCISES: CPT

## 2022-04-26 PROCEDURE — 97530 THERAPEUTIC ACTIVITIES: CPT

## 2022-04-26 NOTE — FLOWSHEET NOTE
[facility] [employer]    Physical Therapy Treatment Note    Date:  2022    Patient Name:  Latisha Posey    :  1955  MRN: [id number]  Medical/Treatment Diagnosis Information:  · Diagnosis: s/p L shoulder rotator cuff repair 21  · Treatment Diagnosis: L shoulder pain P96.735  Insurance/Certification information:  PT Insurance Information: Medicare  Physician Information:  Referring Practitioner: Porsche Ruffin DO  Plan of care signed (Y/N):     Date of Patient follow up with Physician:       Progress Report: []  Yes  [x]  No     Functional Scale:   21 SPADI = 86% disability  22 SPADI = 86% disability  22 SPADI = 33% disability  3/15/22 SPADI= 42% disability  22 SPADI= 35% disability    Date Range for reporting period:  Beginnin22  Ending:     Progress report due (10 Rx/or 30 days whichever is less): 56    Recertification due (POC duration/ or 90 days whichever is less): 22     Visit # Insurance Allowable Auth Needed   29 Medicare []Yes    [x]No     Pain level: 0/10 at rest    SUBJECTIVE: Patient reports shoulder is doing well and feels like his strength is improving steadily. OBJECTIVE: Skilled care provided in flow sheet below; Heavy discussion and education surrounding HEP. Reviewed each exercises patient will be performing for HEP.      Observation:    Test measurements:      DATE 3/17/22 3/25/22 4/1/22 4/8/22 4/14/22 4/20/22 4/26/22   PROM (L) Flex: 160  Abd: 150  ER: 80  IR: 50 Flex: 164  Abd: 150  ER: 80  IR: nt Flex: 165  Abd: 160  ER: 75  IR: 52 Flex: 165  Abd: 165  ER: 85  IR: 52 Flex: 167  Abd: 160  ER: 75 (no OP)  IR: nt Flex: 165  Abd: 160  ER: 80 (no OP)  IR: 58 Flex: 165  Abd: 160  ER: 85  IR: 60   22  Strength (lb) right left   PROM (L) Flex: 33.8  Abd: 24.8  ER: 30.3  IR: 39.7 Flex: 10.0  Abd: 4.4  ER: 11.2  IR: 24.9     Home exercises Program:  3/17/2022: supine DB press, Supine DB OH raise, SL DB ER, SL DB abd, TB row/ext  3/29/22: Corner pec stretch, standing shoulder flexion, Tband IR, Tband IR/ER at 90/90, shoulder D2    RESTRICTIONS/PRECAUTIONS: large RCR, sAD, DCE -  DOS 12/14/21    Exercises/Interventions:   Therapeutic Ex    x10 min Wt / Resistance Sets/sec Reps Notes   Supine DB press up       Standing Pec Stretch at Wall (arm straight)  2 30\" HEP   Supine DB OH raises 2# 2 x10    SL ER 4# 3 10    SL ABD 2# 2 x15    Seated  press 2# 2 10x    Sleeper stretch  2 30\" HEPProne T  Palm down  Prone scaption 7ounces 3   5xea      UBE  3 min     Seated landmine press up 10# 3 x12    HBB stretch At treadmill 15\" 5x    Shoulder Flexion with Anterior Anchored Resistance Red 2 x10 HEP   Tband Abd Yellow 3 x10    Sleeper IR        CC Rows  LPD   Left shoulder only  EXT  Biceps curls   CC L press down Triceps   ER Left   IR Left  30#  50#  15#  5#  15#  5#  15# 3          2 10x          10x    TRX row  3 x12                            Therapeutic Activities   x8       [employer] HEP progression Access Code: [email or web address]  5 min     Standing 90/90 ER/IR Red/blue 2 x10 ea HEP                        Manual Intervention  x13 min       Shoulder PROM  8 min  All planes, stressed ER, IR   GH joint mobs  5 min Gr 3   PA and inferior mobs in neutral, ER and mid range flexion, abd                               NMR re-education  x8 min       End range abduction on half wall 14oz 3 8    DB 90/90 fwd carry's  2# + teal 1 lap x2    Body Blade  20\" ea 3 rounds Med/lat by side  ER/IR by side  Med/lat by FF  Sup/inf by FF   PVC w/ water  30\" on/15\" 3 rounds FF and abduction isometric holds   Low Trap Setting at Wall  2 x10 HEP   latern carry goal is 90/90 ER 5# KB 1 lap 3/29 decrease wt NPV   Tband D2 flexion red 2 x10 HEP   FR on wall w/ banded ER  3 x8    OH ball toss / CP toss 4# OH   6# CP 2 10x    Push up on raised MAT table 26\" 1 x10 HEP     Therapeutic Exercise and NMR EXR  [x] (91486) Provided verbal/tactile cueing for activities related to strengthening, flexibility, endurance, ROM  for improvements in scapular, scapulothoracic and UE control with self care, reaching, carrying, lifting, house/yardwork, driving/computer work. [x] (98000) Provided verbal/tactile cueing for activities related to improving balance, coordination, kinesthetic sense, posture, motor skill, proprioception  to assist with  scapular, scapulothoracic and/ UE control with self care, reaching, carrying, lifting, house/yardwork, driving/computer work. Therapeutic Activities:    [] (83815 or 25903) Provided verbal/tactile cueing for activities related to improving balance, coordination, kinesthetic sense, posture, motor skill, proprioception and motor activation to allow for proper function of scapular, scapulothoracic and UE control with self care, carrying, lifting, driving/computer work.      Home Exercise Program:    [x] (55056) Reviewed/Progressed HEP activities related to strengthening, flexibility, endurance, ROM of scapular, scapulothoracic and UE control with self care, reaching, carrying, lifting, house/yardwork, driving/computer work  [] (51072) Reviewed/Progressed HEP activities related to improving balance, coordination, kinesthetic sense, posture, motor skill, proprioception of scapular, scapulothoracic and UE control with self care, reaching, carrying, lifting, house/yardwork, driving/computer work      Manual Treatments:  PROM / STM / Oscillations-Mobs:  G-I, II, III, IV (PA's, Inf., Post.)  [x] (12904) Provided manual therapy to mobilize soft tissue/joints of cervical/CT, scapular GHJ and UE for the purpose of modulating pain, promoting relaxation,  increasing ROM, reducing/eliminating soft tissue swelling/inflammation/restriction, improving soft tissue extensibility and allowing for proper ROM for normal function with self care, reaching, carrying, lifting, house/yardwork, driving/computer work    Modalities: Charges:  Timed Code Treatment Minutes: 39 min   Total Treatment Minutes: 39 min       [] EVAL (LOW) 00657 (typically 20 minutes face-to-face)  [] EVAL (MOD) 07713 (typically 30 minutes face-to-face)  [] EVAL (HIGH) 82887 (typically 45 minutes face-to-face)  [] [address] !!      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 Independent in HEP and progression per patient tolerance, in order to prevent re-injury. [] Progressing: [x] Met: [] Not Met: [] Adjusted  2. Patient will have a decrease in pain to facilitate improvement in movement, function, and ADLs as indicated by Functional Deficits. [] Progressing: [x] Met: [] Not Met: [] Adjusted     Long Term Goals: To be achieved in: 16-20 weeks (Goals time extended)  1. Disability index score of 20% or less for the DASH to assist with reaching prior level of function. [x] Progressing: [] Met: [] Not Met: [] Adjusted  2. Patient will demonstrate increased AROM to [facility] to allow for proper joint functioning as indicated by patients Functional Deficits. [] Progressing: [x] Met: [] Not Met: [] Adjusted  3. Patient will demonstrate an increase in Strength to within 5lbs of contralateral shoulder to allow for proper functional mobility as indicated by patients Functional Deficits. [x] Progressing: [] Met: [] Not Met: [] Adjusted  4. Patient will return to bathing/dressing/ADLs without increased symptoms or restriction. [] Progressing: [x] Met: [] Not Met: [] Adjusted  5.  Pt will tolerate reaching OH without increased symptoms    [] Progressing: [x] Met: [] Not Met: [] Adjusted    Progression Towards Functional goals:  [x] Patient is progressing as expected towards functional goals listed. [] Progression is slowed due to complexities listed. [] Progression has been slowed due to co-morbidities. [] Plan just implemented, too soon to assess goals progression  [] Other:     ASSESSMENT: Patient doing very well. Provided new home exercises program for him to work on for the 3-4 weeks. He will return to PT after month of home strengthening then will be reevaluated for readiness on discharge. Treatment/Activity Tolerance:  [x] Patient tolerated treatment well   [] Patient limited by fatique  [] Patient limited by pain/ guarding with manual therapy  []Patient limited by other medical complications  [] Other:     Overall Progression Towards Functional goals/ Treatment Progress Update:  [x] Patient is progressing as expected towards functional goals listed. [] Progression is slowed due to complexities/Impairments listed. [] Progression has been slowed due to co-morbidities. [] Plan just implemented, too soon to assess goals progression <30days   [] Goals require adjustment due to lack of progress  [] Patient is not progressing as expected and requires additional follow up with physician  [] Other    Prognosis for POC: [x] Good [] Fair  [] Poor    Patient requires continued skilled intervention: [x] Yes  [] No      PLAN:Frequency/Duration:  1-2 days per week for Weeks: discharge HEP planning    [x] Continue per plan of care [] Alter current plan (see comments)  [] Plan of care initiated [] Hold pending MD visit/ determine additional PT session  [] Discharge    Electronically signed by: Dominick Collet, PT    Note: If patient does not return for scheduled/recommended follow up visits, this note will serve as a discharge from care along with the most recent update on progress.

## 2022-05-17 ENCOUNTER — HOSPITAL ENCOUNTER (OUTPATIENT)
Dept: PHYSICAL THERAPY | Age: 67
Setting detail: THERAPIES SERIES
Discharge: HOME | End: 2022-05-17
Payer: MEDICARE

## 2022-05-17 PROCEDURE — 97110 THERAPEUTIC EXERCISES: CPT

## 2022-05-17 PROCEDURE — 97140 MANUAL THERAPY 1/> REGIONS: CPT

## 2022-05-17 PROCEDURE — 97112 NEUROMUSCULAR REEDUCATION: CPT

## 2022-05-17 NOTE — DISCHARGE SUMMARY
[employer]    Physical Therapy Discharge Summary    Dear Kedar Black, DO    We had the pleasure of treating the following patient for physical therapy services at [address] [place] [address]. A summary of our findings can be found in the discharge summary below. If you have any questions or concerns regarding these findings, please do not hesitate to contact me at the office phone number checked above. Thank you for the referral.     Physician Signature:________________________________Date:__________________  By signing above (or electronic signature), therapists plan is approved by physician      Functional Outcome: SPADI= 17% disability    Overall Response to Treatment:   []Patient is responding well to treatment and improvement is noted with regards  to goals   [x]Patient should continue to improve in reasonable time if they continue HEP   []Patient has plateaued and is no longer responding to skilled PT intervention    []Patient is getting worse and would benefit from return to referring MD   []Patient unable to adhere to initial POC   []Other: Patient has reached max therapeutic benefit from skilled care at this time. He is still demonstrating end ROM deficits and weakness in comparison to the contralateral UE but has shown substantial progress in his strength and active motion since our last visit. All therapeutic goals have been address above with questions and concerns addressed at this time. Patient is reporting no shoulder pain and should continue to improve in reasonable time if they continue HEP. Date range of Visits: 22 - 22  Total Visits: 30    Recommendation: [x] Discharge to HEP. Follow up with PT PRN.        Date:  2022    Patient Name:  Wendall Schwab    :  1955  MRN: [id number]  Medical/Treatment Diagnosis Information:  · Diagnosis: s/p L shoulder rotator cuff repair 21  · Treatment Diagnosis: L shoulder pain W61.330  Insurance/Certification information:  PT Insurance Information: Medicare  Physician Information:  Referring Practitioner: Florence Mercado DO  Plan of care signed (Y/N):     Date of Patient follow up with Physician:       Progress Report: []  Yes  [x]  No     Functional Scale:   21 SPADI = 86% disability  22 SPADI = 86% disability  22 SPADI = 33% disability  3/15/22 SPADI= 42% disability  22 SPADI= 35% disability  22 SPADI= 17% disability    Date Range for reporting period:  Beginnin22  Endin22    Progress report due (10 Rx/or 30 days whichever is less):     Recertification due (POC duration/ or 90 days whichever is less): 22     Visit # Insurance Allowable Auth Needed   30 Medicare []Yes    [x]No     Pain level: 0/10     SUBJECTIVE: Patient reports he is doing well with all home exercises and has resumed most ADL activities. Believes will continue to improve as home exercises continue. Has not yet tried working with a chainsaw yet as Dr. Frandy Johnson said he should weight on this. OBJECTIVE: Skilled care provided in flow sheet below; Heavy discussion and education surrounding HEP.  Reviewed each exercises patient will be performing for HEP     Observation:    Test measurements:      DATE 3/17/22 3/25/22 4/1/22 4/8/22 4/14/22 4/20/22 4/26/22 5/17/22   PROM (L) Flex: 160  Abd: 150  ER: 80  IR: 50 Flex: 164  Abd: 150  ER: 80  IR: nt Flex: 165  Abd: 160  ER: 75  IR: 52 Flex: 165  Abd: 165  ER: 85  IR: 52 Flex: 167  Abd: 160  ER: 75 (no OP)  IR: nt Flex: 165  Abd: 160  ER: 80 (no OP)  IR: 58 Flex: 165  Abd: 160  ER: 85  IR: 60 AROM  Flex: 150  Abd: 160  ER: T3/4  IR: L1/2   22  Strength (lb) right left   PROM (L) Flex: 30.0  Abd: 24.8  ER: 30.3  IR: 39.7 Flex: 18.4  Abd: 12.1  ER: 17.4  IR: 35.4     Home exercises Program:  3/17/2022: supine DB press, Supine DB OH raise, SL DB ER, SL DB abd, TB row/ext  3/29/22: Corner pec stretch, standing shoulder flexion, Tband IR, Tband IR/ER at 90/90, shoulder D2    RESTRICTIONS/PRECAUTIONS: large RCR, sAD, DCE -  DOS 12/14/21    Exercises/Interventions:   Therapeutic Ex    x8 min Wt / Resistance Sets/sec Reps Notes   Pulleys  3 min  Supine DB press up       Standing Pec Stretch at Wall (arm straight)  2 30\"    Supine DB OH raises 2# 2 x10    SL ER 4# 3 10    SL ABD 2# 2 x15    Seated  press 2# 2 10x    Sleeper stretch  2 30\" Prone T  Palm down  Prone scaption 7ounces 3   5xea      UBE  3 min     Seated landmine press up 10# 3 x12    HBB stretch At treadmill 15\" 5x    Shoulder Flexion with Anterior Anchored Resistance Red 2 x10    Tband Abd Yellow 3 x10    Sleeper IR        CC Rows  LPD   Left shoulder only  EXT  Biceps curls   CC L press down Triceps   ER Left   IR Left  30#  50#  15#  5#  15#  5#  15# 3          2 10x          10x    TRX row  3 x12    Reviewed HEP   5 min                      Therapeutic Activities                               Manual Intervention  x15 min       Shoulder PROM    All planes, stressed ER, IR   GH joint mobs   Gr 3   PA and inferior mobs in neutral, ER and mid range flexion, abd   Joint mobility, ROM, strength, functional testing for discharge  15 min                          NMR re-education  x15 min       End range abduction on half wall 14oz 3 8    DB 90/90 fwd carry's  2# + teal 1 lap x2    Body Blade  20\" ea 3 rounds Med/lat by side  ER/IR by side  Med/lat by FF  Sup/inf by FF   PVC w/ water 3lb abd  8lb FF 30\" on/15\" 3 rounds FF and abduction isometric holds   Low Trap Setting at Wall  2 x10    latern carry goal is 90/90 ER 5# KB 1 lap    Tband D2 flexion red 2 x10    FR on wall w/ banded ER  3 x8    OH ball toss / CP toss 4# OH   8# CP 2 x20    Push up on raised MAT table 20\"   Going to failure each set     Therapeutic Exercise and NMR EXR  [x] (21848) Provided verbal/tactile cueing for activities related to strengthening, flexibility, endurance, ROM  for improvements in scapular, scapulothoracic and UE control with self care, reaching, carrying, lifting, house/yardwork, driving/computer work.    [] (45057) Provided verbal/tactile cueing for activities related to improving balance, coordination, kinesthetic sense, posture, motor skill, proprioception  to assist with  scapular, scapulothoracic and/ UE control with self care, reaching, carrying, lifting, house/yardwork, driving/computer work. Therapeutic Activities:    [] (58739 or 41930) Provided verbal/tactile cueing for activities related to improving balance, coordination, kinesthetic sense, posture, motor skill, proprioception and motor activation to allow for proper function of scapular, scapulothoracic and UE control with self care, carrying, lifting, driving/computer work.      Home Exercise Program:    [x] (13265) Reviewed/Progressed HEP activities related to strengthening, flexibility, endurance, ROM of scapular, scapulothoracic and UE control with self care, reaching, carrying, lifting, house/yardwork, driving/computer work  [x] (80113) Reviewed/Progressed HEP activities related to improving balance, coordination, kinesthetic sense, posture, motor skill, proprioception of scapular, scapulothoracic and UE control with self care, reaching, carrying, lifting, house/yardwork, driving/computer work      Manual Treatments:  PROM / STM / Oscillations-Mobs:  G-I, II, III, IV (PA's, Inf., Post.)  [x] (45861) Provided manual therapy to mobilize soft tissue/joints of cervical/CT, scapular GHJ and UE for the purpose of modulating pain, promoting relaxation,  increasing ROM, reducing/eliminating soft tissue swelling/inflammation/restriction, improving soft tissue extensibility and allowing for proper ROM for normal function with self care, reaching, carrying, lifting, house/yardwork, driving/computer work    Modalities:     Charges:  Timed Code Treatment Minutes: 38 min   Total Treatment Minutes: 38 min       [] MARY (LOW) 66732 (typically 20 minutes face-to-face)  [] EVAL (MOD) 23892 (typically 30 minutes face-to-face)  [] EVAL (HIGH) 69186 (typically 45 minutes face-to-face)  [] RE-EVAL     NEEDS KX MODIFIER !!      [x] NO(33370) x   1  [] IONTO (08462)  [x] NMR (90881) x   1  [] VASO (94380)   [x] Manual (90547) x   1  [] Other:  [] TA (44203)x     [] Mech Traction (58968)  [] ES(attended) (30153)     [] ES (un) (75109):     GOALS:  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 Independent in HEP and progression per patient tolerance, in order to prevent re-injury. [] Progressing: [x] Met: [] Not Met: [] Adjusted  2. Patient will have a decrease in pain to facilitate improvement in movement, function, and ADLs as indicated by Functional Deficits. [] Progressing: [x] Met: [] Not Met: [] Adjusted     Long Term Goals: To be achieved in: 16-20 weeks (Goals time extended)  1. Disability index score of 20% or less for the DASH to assist with reaching prior level of function. [] Progressing: [x] Met: [] Not Met: [] Adjusted  2. Patient will demonstrate increased AROM to [facility] to allow for proper joint functioning as indicated by patients Functional Deficits. [] Progressing: [x] Met: [] Not Met: [] Adjusted  3. Patient will demonstrate an increase in Strength to within 5lbs of contralateral shoulder to allow for proper functional mobility as indicated by patients Functional Deficits. [] Progressing: [] Met: [x] Not Met: [] Adjusted  4. Patient will return to bathing/dressing/ADLs without increased symptoms or restriction. [] Progressing: [x] Met: [] Not Met: [] Adjusted  5. Pt will tolerate reaching OH without increased symptoms    [] Progressing: [x] Met: [] Not Met: [] Adjusted    Progression Towards Functional goals:  [x] Patient is progressing as expected towards functional goals listed. [] Progression is slowed due to complexities listed. [] Progression has been slowed due to co-morbidities. [] Plan just implemented, too soon to assess goals progression  [] Other:     ASSESSMENT: Patient has reached max therapeutic benefit from skilled care at this time. He is still demonstrating end ROM deficits and weakness in comparison to the contralateral UE but has shown substantial progress in his strength and active motion since our last visit. All therapeutic goals have been address above with questions and concerns addressed at this time. Patient is reporting no shoulder pain and should continue to improve in reasonable time if they continue HEP. Treatment/Activity Tolerance:  [x] Patient tolerated treatment well   [] Patient limited by fatique  [] Patient limited by pain/ guarding with manual therapy  []Patient limited by other medical complications  [] Other:     Overall Progression Towards Functional goals/ Treatment Progress Update:  [] Patient is progressing as expected towards functional goals listed. [] Progression is slowed due to complexities/Impairments listed. [] Progression has been slowed due to co-morbidities. [] Plan just implemented, too soon to assess goals progression <30days   [] Goals require adjustment due to lack of progress  [] Patient is not progressing as expected and requires additional follow up with physician  [x] Other: Discharge    Prognosis for POC: [x] Good [] Fair  [] Poor    Patient requires continued skilled intervention: [x] Yes  [] No      PLAN:Frequency/Duration:  1-2 days per week for Weeks: discharge HEP planning    [] Continue per plan of care [] Alter current plan (see comments)  [] Plan of care initiated [] Hold pending MD visit/ determine additional PT session  [x] Discharge    Electronically signed by:  James Qureshi PT    Note: If patient does not return for scheduled/recommended follow up visits, this note will serve as a discharge from care along with the most recent update on progress.

## 2024-07-22 ENCOUNTER — HOSPITAL ENCOUNTER (OUTPATIENT)
Dept: MRI IMAGING | Age: 69
Discharge: HOME | End: 2024-07-22
Attending: ORTHOPAEDIC SURGERY
Payer: MEDICARE

## 2024-07-22 DIAGNOSIS — M23.203 OLD TEAR OF MEDIAL MENISCUS OF RIGHT KNEE, UNSPECIFIED TEAR TYPE: ICD-10-CM

## 2024-07-22 PROCEDURE — 73721 MRI JNT OF LWR EXTRE W/O DYE: CPT
